# Patient Record
Sex: FEMALE | Race: WHITE | NOT HISPANIC OR LATINO | Employment: STUDENT | URBAN - METROPOLITAN AREA
[De-identification: names, ages, dates, MRNs, and addresses within clinical notes are randomized per-mention and may not be internally consistent; named-entity substitution may affect disease eponyms.]

---

## 2017-06-13 ENCOUNTER — TELEPHONE (OUTPATIENT)
Dept: URGENT CARE | Facility: CLINIC | Age: 47
End: 2017-06-13

## 2017-06-13 PROCEDURE — 87186 SC STD MICRODIL/AGAR DIL: CPT | Performed by: NURSE PRACTITIONER

## 2017-06-13 PROCEDURE — 87077 CULTURE AEROBIC IDENTIFY: CPT | Performed by: NURSE PRACTITIONER

## 2017-06-13 PROCEDURE — 87086 URINE CULTURE/COLONY COUNT: CPT | Performed by: NURSE PRACTITIONER

## 2017-06-13 NOTE — TELEPHONE ENCOUNTER
Ms. Larsen called stating she vomited approximately 1 hour after taking her first dose of Omnicef, she denies any other episodes of vomiting and states she thinks she has taken Omnicef in the past with no side effects. Macrobid 100mg BID x 7 days called into pharmacy on file. Advised to stop Omnicef, start macrobid and if continues to vomit go to ER.

## 2017-06-15 ENCOUNTER — TELEPHONE (OUTPATIENT)
Dept: URGENT CARE | Facility: CLINIC | Age: 47
End: 2017-06-15

## 2017-06-15 NOTE — TELEPHONE ENCOUNTER
Patient called for results of labs. Informed her of the results and stated that she should finish medication that was prescribed.   Patient stated understanding. No additional questions.

## 2017-09-22 ENCOUNTER — OFFICE VISIT (OUTPATIENT)
Dept: INTERNAL MEDICINE | Facility: CLINIC | Age: 47
End: 2017-09-22

## 2017-09-22 VITALS
HEIGHT: 64 IN | DIASTOLIC BLOOD PRESSURE: 76 MMHG | WEIGHT: 137.6 LBS | SYSTOLIC BLOOD PRESSURE: 125 MMHG | TEMPERATURE: 98.2 F | HEART RATE: 78 BPM | OXYGEN SATURATION: 100 % | BODY MASS INDEX: 23.49 KG/M2

## 2017-09-22 DIAGNOSIS — J45.20 ASTHMA, MILD INTERMITTENT, WELL-CONTROLLED: ICD-10-CM

## 2017-09-22 DIAGNOSIS — R09.81 NASAL CONGESTION: ICD-10-CM

## 2017-09-22 DIAGNOSIS — Z76.89 ENCOUNTER TO ESTABLISH CARE: Primary | ICD-10-CM

## 2017-09-22 PROCEDURE — 99203 OFFICE O/P NEW LOW 30 MIN: CPT | Performed by: NURSE PRACTITIONER

## 2017-09-22 RX ORDER — ASPIRIN 81 MG/1
81 TABLET ORAL WEEKLY
COMMUNITY
End: 2022-10-04

## 2017-09-22 RX ORDER — ALBUTEROL SULFATE 90 UG/1
2 AEROSOL, METERED RESPIRATORY (INHALATION)
Status: DISCONTINUED | OUTPATIENT
Start: 2017-09-22 | End: 2017-09-22

## 2017-09-22 RX ORDER — THIAMINE MONONITRATE (VIT B1) 100 MG
100 TABLET ORAL WEEKLY
COMMUNITY
End: 2022-10-04

## 2017-09-22 RX ORDER — CHLORPHENIRAMINE MALEATE 4 MG/1
4 TABLET ORAL EVERY 6 HOURS PRN
COMMUNITY
End: 2022-10-04

## 2017-09-22 RX ORDER — ALBUTEROL SULFATE 90 UG/1
2 AEROSOL, METERED RESPIRATORY (INHALATION) EVERY 4 HOURS PRN
Qty: 1 INHALER | Refills: 2 | Status: SHIPPED | OUTPATIENT
Start: 2017-09-22 | End: 2022-10-04

## 2017-09-22 NOTE — PROGRESS NOTES
"Mike Larsen is a 47 y.o. female here to establish care.  Chief Complaint   Patient presents with   • Establish Care     History of Present Illness     SOA- Has a history of asthma.  Has been well controlled for many years without the use of medications.  Concerned that asthma is \"coming back.\"  If she runs \"chasing the puppy\", she gets really short of breath.  Has been going on for a few months.  Feels like she cannot breath through her nose. Has always had a lot of problems with her sinuses and allergies.   Has tried saline rinses and neti pot without relief.  Has tried Flonase in th past for one day and it made her feel like her nose was dried up and she has a nose bleed.  Gets SOA nearly every day.  No nighttime awakenings.  Denies wheezing and cough  Does not have a rescue inhaler.       Health maintenance:    Influenza: declines  Tdap: 2007  Pap: 2013   Mammogram: 2012-declines (had dense breast tissue and had to have repeat deshawn with an US- was normal)  Tobacco use: denies  LMP:  9/21/2017      The following portions of the patient's history were reviewed and updated as appropriate: allergies, current medications, past family history, past medical history, past social history, past surgical history and problem list.    Review of Systems   Constitutional: Negative for chills, fatigue and fever.   HENT: Positive for congestion (nasal) and sinus pressure. Negative for nosebleeds, postnasal drip, rhinorrhea, sneezing and sore throat.    Eyes: Negative for pain, discharge and itching.   Respiratory: Positive for shortness of breath. Negative for cough, chest tightness and wheezing.    Cardiovascular: Negative for chest pain.   Gastrointestinal: Negative for abdominal pain, blood in stool, constipation, diarrhea, nausea and vomiting.   Endocrine: Negative for cold intolerance and heat intolerance.   Genitourinary: Negative for difficulty urinating, flank pain and frequency.   Musculoskeletal: Negative " "for arthralgias, back pain, joint swelling and myalgias.   Skin: Negative for pallor and rash.   Allergic/Immunologic: Positive for environmental allergies.   Neurological: Negative for dizziness, syncope, weakness, light-headedness, numbness and headaches.   Hematological: Negative for adenopathy. Does not bruise/bleed easily.   Psychiatric/Behavioral: Negative for dysphoric mood and sleep disturbance. The patient is not nervous/anxious.    All other systems reviewed and are negative.    Blood pressure 125/76, pulse 78, temperature 98.2 °F (36.8 °C), height 63.7\" (161.8 cm), weight 137 lb 9.6 oz (62.4 kg), last menstrual period 09/22/2017, SpO2 100 %, not currently breastfeeding.    Allergies   Allergen Reactions   • Epinephrine-Lidocaine-Na Metabisulfite [Lidocaine-Epinephrine] Dizziness     Past Medical History:   Diagnosis Date   • Allergic    • Asthma    • Factor V Leiden    • MVP (mitral valve prolapse)     echo every 5 yrs last one in 2013     Past Surgical History:   Procedure Laterality Date   • CHOLECYSTECTOMY  2013   • VAGINAL DELIVERY      x2, (2000, 2005)   • WISDOM TOOTH EXTRACTION  1993     Family History   Problem Relation Age of Onset   • Cancer Mother      lung   • COPD Mother    • Other Mother      PAD    • No Known Problems Father    • No Known Problems Sister    • No Known Problems Brother    • Alzheimer's disease Paternal Grandmother    • Cataracts Paternal Grandmother    • No Known Problems Sister    • No Known Problems Brother    • Hyperlipidemia Daughter    • Hyperlipidemia Daughter      Social History     Social History   • Marital status:      Spouse name: N/A   • Number of children: N/A   • Years of education: N/A     Occupational History   • student      Graduate school for speech pathology     Social History Main Topics   • Smoking status: Never Smoker   • Smokeless tobacco: Never Used   • Alcohol use 3.0 oz/week     5 Glasses of wine per week   • Drug use: No   • Sexual " activity: Yes     Partners: Male     Birth control/ protection: Other      Comment:  had vasectomy     Other Topics Concern   • Not on file     Social History Narrative   • No narrative on file     Immunization History   Administered Date(s) Administered   • DTaP, Unspecified 01/01/1993   • Influenza, Unspecified 01/01/1993   • Pneumococcal Polysaccharide 01/01/1993   • Rubella 01/01/1993   • Tdap 01/01/2007   • Tetanus 01/01/2007       Current Outpatient Prescriptions:   •  aspirin 81 MG EC tablet, Take 81 mg by mouth 1 (One) Time Per Week., Disp: , Rfl:   •  chlorpheniramine (CHLORPHEN) 4 MG tablet, Take 4 mg by mouth Every 6 (Six) Hours As Needed for Allergies., Disp: , Rfl:   •  Multiple Vitamins-Minerals (MULTIVITAL PO), Take  by mouth., Disp: , Rfl:   •  thiamine (VITAMIN B-1) 100 MG tablet, Take 100 mg by mouth 1 (One) Time Per Week., Disp: , Rfl:   •  budesonide (RHINOCORT AQUA) 32 MCG/ACT nasal spray, 1 spray into each nostril Daily., Disp: 1 bottle, Rfl: 1  •  Chlorcyclizine-Pseudoephed (STAHIST AD PO), Take  by mouth., Disp: , Rfl:   No current facility-administered medications for this visit.     Objective   Physical Exam   Constitutional: She is oriented to person, place, and time. She appears well-developed and well-nourished. She does not appear ill. No distress.   HENT:   Head: Normocephalic and atraumatic.   Right Ear: Tympanic membrane, external ear and ear canal normal.   Left Ear: Tympanic membrane, external ear and ear canal normal.   Nose: Mucosal edema and rhinorrhea present. Right sinus exhibits no maxillary sinus tenderness and no frontal sinus tenderness. Left sinus exhibits no maxillary sinus tenderness and no frontal sinus tenderness.   Mouth/Throat: Uvula is midline, oropharynx is clear and moist and mucous membranes are normal.   Eyes: Conjunctivae are normal. Pupils are equal, round, and reactive to light.   Neck: Normal range of motion.   Cardiovascular: Normal rate, regular  rhythm and normal heart sounds.    Pulmonary/Chest: Effort normal and breath sounds normal. No respiratory distress.   Abdominal: Soft. Bowel sounds are normal. She exhibits no distension.   Musculoskeletal: Normal range of motion. She exhibits no edema.   Lymphadenopathy:     She has no cervical adenopathy.   Neurological: She is alert and oriented to person, place, and time.   Skin: Skin is warm and dry. She is not diaphoretic.   Psychiatric: She has a normal mood and affect.   Nursing note and vitals reviewed.      Assessment/Plan   Elena was seen today for establish care.    Diagnoses and all orders for this visit:    Encounter to establish care    Nasal congestion  -     budesonide (RHINOCORT AQUA) 32 MCG/ACT nasal spray; 1 spray into each nostril Daily.    Asthma, mild intermittent, well-controlled  -     albuterol (PROVENTIL HFA;VENTOLIN HFA) 108 (90 Base) MCG/ACT inhaler; Inhale 2 puffs Every 4 (Four) Hours As Needed for Wheezing or Shortness of Air.      Outpatient Encounter Prescriptions as of 9/22/2017   Medication Sig Dispense Refill   • aspirin 81 MG EC tablet Take 81 mg by mouth 1 (One) Time Per Week.     • chlorpheniramine (CHLORPHEN) 4 MG tablet Take 4 mg by mouth Every 6 (Six) Hours As Needed for Allergies.     • Multiple Vitamins-Minerals (MULTIVITAL PO) Take  by mouth.     • thiamine (VITAMIN B-1) 100 MG tablet Take 100 mg by mouth 1 (One) Time Per Week.     • budesonide (RHINOCORT AQUA) 32 MCG/ACT nasal spray 1 spray into each nostril Daily. 1 bottle 1   • Chlorcyclizine-Pseudoephed (STAHIST AD PO) Take  by mouth.       Facility-Administered Encounter Medications as of 9/22/2017   Medication Dose Route Frequency Provider Last Rate Last Dose   • [DISCONTINUED] albuterol (PROVENTIL HFA;VENTOLIN HFA) inhaler 2 puff  2 puff Inhalation 4x Daily - RT Ebony VIEIRA MD                Albuterol inhaler PRN SOA, may consider spirometry at FU  Will try rhinocort for nasal sx's  Will requesrt records  from prior PCP, she will bring VISHNU back once she finds the name of her former PCP  RTC 1 month for PE with PAP and labs  or sooner with any problems, worsening of sx's  Plan of care discussed with pt. They verbalized understanding and agreement.

## 2017-11-09 PROCEDURE — 87070 CULTURE OTHR SPECIMN AEROBIC: CPT | Performed by: PERSONAL EMERGENCY RESPONSE ATTENDANT

## 2017-11-12 ENCOUNTER — TELEPHONE (OUTPATIENT)
Dept: URGENT CARE | Facility: CLINIC | Age: 47
End: 2017-11-12

## 2017-11-13 ENCOUNTER — TELEPHONE (OUTPATIENT)
Dept: URGENT CARE | Facility: CLINIC | Age: 47
End: 2017-11-13

## 2017-11-13 NOTE — TELEPHONE ENCOUNTER
Mrs. Larsen returned call to [facility] and I advised her that we got her throat culture results and it came back no strep and a light growth of normal respiratory altagracia. Patient stated that her throat is still very sore and she feels like it is turning into bronchitis.  I advised patient to follow-up with her PCP. Patient verbalized understanding.

## 2022-10-04 ENCOUNTER — OFFICE VISIT (OUTPATIENT)
Dept: FAMILY MEDICINE CLINIC | Facility: CLINIC | Age: 52
End: 2022-10-04

## 2022-10-04 VITALS
HEART RATE: 78 BPM | HEIGHT: 63 IN | BODY MASS INDEX: 28.33 KG/M2 | OXYGEN SATURATION: 99 % | DIASTOLIC BLOOD PRESSURE: 74 MMHG | SYSTOLIC BLOOD PRESSURE: 118 MMHG | WEIGHT: 159.9 LBS

## 2022-10-04 DIAGNOSIS — R11.2 NAUSEA AND VOMITING, UNSPECIFIED VOMITING TYPE: ICD-10-CM

## 2022-10-04 DIAGNOSIS — Z00.00 ENCOUNTER FOR ROUTINE ADULT MEDICAL EXAMINATION: Primary | ICD-10-CM

## 2022-10-04 DIAGNOSIS — Z12.11 COLON CANCER SCREENING: ICD-10-CM

## 2022-10-04 PROCEDURE — 99396 PREV VISIT EST AGE 40-64: CPT | Performed by: PHYSICIAN ASSISTANT

## 2022-10-04 PROCEDURE — 99213 OFFICE O/P EST LOW 20 MIN: CPT | Performed by: PHYSICIAN ASSISTANT

## 2022-10-04 RX ORDER — PANTOPRAZOLE SODIUM 40 MG/1
40 TABLET, DELAYED RELEASE ORAL DAILY
Qty: 30 TABLET | Refills: 5 | Status: SHIPPED | OUTPATIENT
Start: 2022-10-04

## 2022-10-04 RX ORDER — FLUTICASONE PROPIONATE 50 MCG
SPRAY, SUSPENSION (ML) NASAL
COMMUNITY
End: 2022-10-04

## 2022-10-04 NOTE — ASSESSMENT & PLAN NOTE
Discussed injury prevention, diet and exercise, safe sexual practices, and screening for common diseases.  NOTE: She refuses to do mammograms and prefers Cologuard rather than colonoscopy, reports no family HX of colon cancer.   Encouraged use of sunscreen and seatbelts. Discussed timing of colon cancer cancer screening, prostate cancer screening, and review of skin for lesions. Avoidance of tobacco encouraged. Limitation or avoidance of alcohol encouraged. Recommend yearly dental and eye exams. Also discussed monitoring of blood pressure and lipids. Routine screening labs ordered. Further recommendations will depend upon those results.

## 2022-10-04 NOTE — ASSESSMENT & PLAN NOTE
I am going to try Pantoprazole thinking this might be gastritis or GERD, but I did tell her if this doesn't improve she should see GI for an opinion.

## 2022-10-04 NOTE — PROGRESS NOTES
"Chief Complaint  Annual Exam    Subjective          Elena Larsen presents to [facility]  History of Present Illness patient comes in today for an annual exam.  She states that she continues to have periodic nausea and vomiting and this has been going on for a year, it comes and goes and is exacerbated by certain foods.  Objective   Vital Signs:   /74   Pulse 78   Ht 160 cm (63\")   Wt 72.5 kg (159 lb 14.4 oz)   SpO2 99%   BMI 28.33 kg/m²     Body mass index is 28.33 kg/m².    Review of Systems   Constitutional: Negative.  Negative for chills, fatigue and fever.   HENT: Negative.    Eyes: Negative.    Respiratory: Negative.  Negative for cough and shortness of breath.    Cardiovascular: Negative.  Negative for chest pain and palpitations.   Gastrointestinal: Positive for vomiting. Negative for abdominal pain, constipation, diarrhea, GERD and indigestion.   Endocrine: Negative.    Genitourinary: Negative.    Musculoskeletal: Negative.  Negative for back pain and myalgias.   Skin: Negative.    Allergic/Immunologic: Negative.    Neurological: Negative for dizziness and headache.   Hematological: Negative.    Psychiatric/Behavioral: Negative for depressed mood. The patient is not nervous/anxious.        Past History:  Medical History: has a past medical history of Allergic, Asthma, Dysplasia of cervix (2001), Factor V Leiden (HCC), and MVP (mitral valve prolapse).   Surgical History: has a past surgical history that includes Cholecystectomy (2013); Cavalier tooth extraction (1993); and Vaginal delivery.   Family History: family history includes Alcohol abuse in her maternal grandmother and mother; Alzheimer's disease in her paternal grandmother; COPD in her mother; Cancer in her mother; Cataracts in her paternal grandmother; Coronary artery disease in her paternal grandfather; Hyperlipidemia in her daughter and daughter; No Known Problems in her brother, brother, father, sister, " and sister; Other in her mother.   Social History: reports that she has never smoked. She has never used smokeless tobacco. She reports current alcohol use of about 5.0 standard drinks of alcohol per week. She reports that she does not use drugs.      Current Outpatient Medications:   •  Chlorcyclizine-Pseudoephed (STAHIST AD PO), Take  by mouth., Disp: , Rfl:   •  Multiple Vitamins-Minerals (MULTIVITAL PO), Take  by mouth., Disp: , Rfl:   •  pantoprazole (PROTONIX) 40 MG EC tablet, Take 1 tablet by mouth Daily., Disp: 30 tablet, Rfl: 5    Allergies: Epinephrine-lidocaine-na metabisulfite [lidocaine-epinephrine] and Epinephrine    Physical Exam  Vitals reviewed.   HENT:      Head: Normocephalic.      Nose: Nose normal.      Mouth/Throat:      Mouth: Mucous membranes are moist.   Eyes:      Pupils: Pupils are equal, round, and reactive to light.   Cardiovascular:      Rate and Rhythm: Normal rate and regular rhythm.      Pulses: Normal pulses.      Heart sounds: Normal heart sounds.   Pulmonary:      Effort: Pulmonary effort is normal.      Breath sounds: Normal breath sounds.   Abdominal:      General: Abdomen is flat. Bowel sounds are normal. There is no distension.      Palpations: Abdomen is soft. There is no mass.      Tenderness: There is no abdominal tenderness. There is no guarding or rebound.      Hernia: No hernia is present.   Musculoskeletal:         General: Normal range of motion.      Cervical back: Normal range of motion and neck supple.   Skin:     General: Skin is warm and dry.   Neurological:      General: No focal deficit present.      Mental Status: She is alert and oriented to person, place, and time.   Psychiatric:         Mood and Affect: Mood normal.         Behavior: Behavior normal.          Result Review :                   Assessment and Plan    Diagnoses and all orders for this visit:    1. Encounter for routine adult medical examination (Primary)  Assessment & Plan:  Discussed injury  prevention, diet and exercise, safe sexual practices, and screening for common diseases.  NOTE: She refuses to do mammograms and prefers Cologuard rather than colonoscopy, reports no family HX of colon cancer.   Encouraged use of sunscreen and seatbelts. Discussed timing of colon cancer cancer screening, prostate cancer screening, and review of skin for lesions. Avoidance of tobacco encouraged. Limitation or avoidance of alcohol encouraged. Recommend yearly dental and eye exams. Also discussed monitoring of blood pressure and lipids. Routine screening labs ordered. Further recommendations will depend upon those results.     Orders:  -     CBC & Differential; Future  -     Comprehensive Metabolic Panel; Future  -     Lipid Panel; Future  -     TSH; Future  -     CBC & Differential  -     Comprehensive Metabolic Panel  -     Lipid Panel  -     TSH    2. Colon cancer screening  Assessment & Plan:  Will do Cologuard.      Orders:  -     Cologuard - Stool, Per Rectum; Future    3. Nausea and vomiting, unspecified vomiting type  Assessment & Plan:  I am going to try Pantoprazole thinking this might be gastritis or GERD, but I did tell her if this doesn't improve she should see GI for an opinion.       Orders:  -     Ambulatory Referral to Gastroenterology    Other orders  -     pantoprazole (PROTONIX) 40 MG EC tablet; Take 1 tablet by mouth Daily.  Dispense: 30 tablet; Refill: 5      Follow Up   Return if symptoms worsen or fail to improve.  Patient was given instructions and counseling regarding her condition or for health maintenance advice. Please see specific information pulled into the AVS if appropriate.     Sparkle Champagne PA-C

## 2022-10-05 LAB
ALBUMIN SERPL-MCNC: 4.9 G/DL (ref 3.8–4.8)
ALBUMIN/GLOB SERPL: 2.1 {RATIO} (ref 1.2–2.2)
ALP SERPL-CCNC: 77 IU/L (ref 44–121)
ALT SERPL-CCNC: 13 IU/L (ref 0–32)
AST SERPL-CCNC: 19 IU/L (ref 0–40)
BASOPHILS # BLD AUTO: 0.1 X10E3/UL (ref 0–0.2)
BASOPHILS NFR BLD AUTO: 1 %
BILIRUB SERPL-MCNC: 0.4 MG/DL (ref 0–1.2)
BUN SERPL-MCNC: 11 MG/DL (ref 6–24)
BUN/CREAT SERPL: 14 (ref 9–23)
CALCIUM SERPL-MCNC: 9.4 MG/DL (ref 8.7–10.2)
CHLORIDE SERPL-SCNC: 105 MMOL/L (ref 96–106)
CHOLEST SERPL-MCNC: 202 MG/DL (ref 100–199)
CO2 SERPL-SCNC: 26 MMOL/L (ref 20–29)
CREAT SERPL-MCNC: 0.8 MG/DL (ref 0.57–1)
EGFRCR SERPLBLD CKD-EPI 2021: 90 ML/MIN/1.73
EOSINOPHIL # BLD AUTO: 0.1 X10E3/UL (ref 0–0.4)
EOSINOPHIL NFR BLD AUTO: 1 %
ERYTHROCYTE [DISTWIDTH] IN BLOOD BY AUTOMATED COUNT: 11.9 % (ref 11.7–15.4)
GLOBULIN SER CALC-MCNC: 2.3 G/DL (ref 1.5–4.5)
GLUCOSE SERPL-MCNC: 89 MG/DL (ref 70–99)
HCT VFR BLD AUTO: 43.2 % (ref 34–46.6)
HDLC SERPL-MCNC: 77 MG/DL
HGB BLD-MCNC: 13.7 G/DL (ref 11.1–15.9)
IMM GRANULOCYTES # BLD AUTO: 0 X10E3/UL (ref 0–0.1)
IMM GRANULOCYTES NFR BLD AUTO: 0 %
LDLC SERPL CALC-MCNC: 108 MG/DL (ref 0–99)
LYMPHOCYTES # BLD AUTO: 1.9 X10E3/UL (ref 0.7–3.1)
LYMPHOCYTES NFR BLD AUTO: 35 %
MCH RBC QN AUTO: 26.8 PG (ref 26.6–33)
MCHC RBC AUTO-ENTMCNC: 31.7 G/DL (ref 31.5–35.7)
MCV RBC AUTO: 84 FL (ref 79–97)
MONOCYTES # BLD AUTO: 0.4 X10E3/UL (ref 0.1–0.9)
MONOCYTES NFR BLD AUTO: 8 %
NEUTROPHILS # BLD AUTO: 3 X10E3/UL (ref 1.4–7)
NEUTROPHILS NFR BLD AUTO: 55 %
PLATELET # BLD AUTO: 331 X10E3/UL (ref 150–450)
POTASSIUM SERPL-SCNC: 4.9 MMOL/L (ref 3.5–5.2)
PROT SERPL-MCNC: 7.2 G/DL (ref 6–8.5)
RBC # BLD AUTO: 5.12 X10E6/UL (ref 3.77–5.28)
SODIUM SERPL-SCNC: 145 MMOL/L (ref 134–144)
TRIGL SERPL-MCNC: 95 MG/DL (ref 0–149)
TSH SERPL DL<=0.005 MIU/L-ACNC: 1.87 UIU/ML (ref 0.45–4.5)
VLDLC SERPL CALC-MCNC: 17 MG/DL (ref 5–40)
WBC # BLD AUTO: 5.5 X10E3/UL (ref 3.4–10.8)

## 2022-10-07 ENCOUNTER — TELEPHONE (OUTPATIENT)
Dept: FAMILY MEDICINE CLINIC | Facility: CLINIC | Age: 52
End: 2022-10-07

## 2023-09-14 ENCOUNTER — OFFICE VISIT (OUTPATIENT)
Dept: FAMILY MEDICINE CLINIC | Facility: CLINIC | Age: 53
End: 2023-09-14
Payer: COMMERCIAL

## 2023-09-14 VITALS
BODY MASS INDEX: 27.64 KG/M2 | OXYGEN SATURATION: 98 % | SYSTOLIC BLOOD PRESSURE: 118 MMHG | HEART RATE: 87 BPM | WEIGHT: 156 LBS | DIASTOLIC BLOOD PRESSURE: 80 MMHG | HEIGHT: 63 IN

## 2023-09-14 DIAGNOSIS — M54.42 ACUTE BILATERAL LOW BACK PAIN WITH LEFT-SIDED SCIATICA: Primary | ICD-10-CM

## 2023-09-14 PROCEDURE — 99213 OFFICE O/P EST LOW 20 MIN: CPT | Performed by: PHYSICIAN ASSISTANT

## 2023-09-14 RX ORDER — BACLOFEN 10 MG/1
10 TABLET ORAL 3 TIMES DAILY
Qty: 30 TABLET | Refills: 1 | Status: SHIPPED | OUTPATIENT
Start: 2023-09-14

## 2023-09-14 RX ORDER — KETOROLAC TROMETHAMINE 30 MG/ML
60 INJECTION, SOLUTION INTRAMUSCULAR; INTRAVENOUS ONCE
Status: COMPLETED | OUTPATIENT
Start: 2023-09-14 | End: 2023-09-14

## 2023-09-14 RX ORDER — CETIRIZINE HYDROCHLORIDE 10 MG/1
10 TABLET ORAL DAILY
COMMUNITY

## 2023-09-14 RX ORDER — PSEUDOEPHEDRINE HCL 30 MG
30 TABLET ORAL
COMMUNITY

## 2023-09-14 RX ORDER — CYCLOBENZAPRINE HCL 10 MG
10 TABLET ORAL
COMMUNITY
Start: 2023-09-06 | End: 2023-09-14

## 2023-09-14 RX ORDER — TRIAMCINOLONE ACETONIDE 40 MG/ML
80 INJECTION, SUSPENSION INTRA-ARTICULAR; INTRAMUSCULAR ONCE
Status: COMPLETED | OUTPATIENT
Start: 2023-09-14 | End: 2023-09-14

## 2023-09-14 RX ORDER — NABUMETONE 500 MG/1
500 TABLET, FILM COATED ORAL 2 TIMES DAILY PRN
Qty: 30 TABLET | Refills: 1 | Status: SHIPPED | OUTPATIENT
Start: 2023-09-14

## 2023-09-14 RX ADMIN — KETOROLAC TROMETHAMINE 60 MG: 30 INJECTION, SOLUTION INTRAMUSCULAR; INTRAVENOUS at 11:47

## 2023-09-14 RX ADMIN — TRIAMCINOLONE ACETONIDE 80 MG: 40 INJECTION, SUSPENSION INTRA-ARTICULAR; INTRAMUSCULAR at 11:48

## 2023-09-14 NOTE — PROGRESS NOTES
"Chief Complaint  Back Pain (Lower back pain started last week )    Subjective        Elena Larsen presents to [facility]  History of Present Illness  Patient reports today secondary to having low back pain for the past week.  Patient reports bending over at an awkward angle and then sneezing the day before her back pain started, believe that incident caused her flare up.  Patient reports having episodes of low back pain around 2 times every year.  Patient reports going to urgent care and being prescribed Flexeril.  Patient states she has been taking it off and on and no relief.  Patient reports going to her masseuse and the massage did not help.  Patient reports the pain is achy and initially it went down her left leg.  Patient states it is now sitting in her back.  Patient reports no relief with ibuprofen or Tylenol.  Back Pain      Objective   Vital Signs:  /80   Pulse 87   Ht 160 cm (63\")   Wt 70.8 kg (156 lb)   SpO2 98%   BMI 27.63 kg/m²   Estimated body mass index is 27.63 kg/m² as calculated from the following:    Height as of this encounter: 160 cm (63\").    Weight as of this encounter: 70.8 kg (156 lb).             Physical Exam  Vitals and nursing note reviewed.   Constitutional:       General: She is not in acute distress.     Appearance: She is not ill-appearing.   Cardiovascular:      Rate and Rhythm: Normal rate and regular rhythm.   Pulmonary:      Effort: Pulmonary effort is normal. No respiratory distress.   Musculoskeletal:         General: Tenderness present. Normal range of motion.      Comments: Patient is tender upon palpation to bilateral lumbar paraspinous muscles.  Patient is non-tender upon palpation to lumbar, cervical and thoracic spine.     Skin:     General: Skin is warm and dry.   Neurological:      Gait: Gait normal.   Psychiatric:         Mood and Affect: Mood normal.      Result Review :                   Assessment and Plan   Diagnoses and " all orders for this visit:    1. Acute bilateral low back pain with left-sided sciatica (Primary)  Assessment & Plan:  Patient provided with Toradol and Kenalog injection this date.  Patient will discontinue cyclobenzaprine since she reports no relief.  Patient prescribed baclofen and nabumetone today.  Discussed importance of stretching.  Advised patient to discontinue heavy workouts for the next 5 to 7 days.  Discussed signs of worsening symptoms and advise ER should they occur.    Orders:  -     baclofen (LIORESAL) 10 MG tablet; Take 1 tablet by mouth 3 (Three) Times a Day.  Dispense: 30 tablet; Refill: 1  -     nabumetone (RELAFEN) 500 MG tablet; Take 1 tablet by mouth 2 (Two) Times a Day As Needed for Mild Pain or Moderate Pain. With food  Dispense: 30 tablet; Refill: 1  -     triamcinolone acetonide (KENALOG-40) injection 80 mg  -     ketorolac (TORADOL) injection 60 mg             Follow Up   Return if symptoms worsen or fail to improve.  Patient was given instructions and counseling regarding her condition or for health maintenance advice. Please see specific information pulled into the AVS if appropriate.

## 2023-09-14 NOTE — ASSESSMENT & PLAN NOTE
Patient provided with Toradol and Kenalog injection this date.  Patient will discontinue cyclobenzaprine since she reports no relief.  Patient prescribed baclofen and nabumetone today.  Discussed importance of stretching.  Advised patient to discontinue heavy workouts for the next 5 to 7 days.  Discussed signs of worsening symptoms and advise ER should they occur.

## 2023-12-13 ENCOUNTER — TELEPHONE (OUTPATIENT)
Dept: FAMILY MEDICINE CLINIC | Facility: CLINIC | Age: 53
End: 2023-12-13

## 2023-12-13 ENCOUNTER — OFFICE VISIT (OUTPATIENT)
Dept: FAMILY MEDICINE CLINIC | Facility: CLINIC | Age: 53
End: 2023-12-13
Payer: COMMERCIAL

## 2023-12-13 VITALS
DIASTOLIC BLOOD PRESSURE: 90 MMHG | SYSTOLIC BLOOD PRESSURE: 140 MMHG | HEART RATE: 78 BPM | BODY MASS INDEX: 27.87 KG/M2 | OXYGEN SATURATION: 100 % | HEIGHT: 63 IN | WEIGHT: 157.3 LBS

## 2023-12-13 DIAGNOSIS — M54.41 CHRONIC RIGHT-SIDED LOW BACK PAIN WITH RIGHT-SIDED SCIATICA: Primary | ICD-10-CM

## 2023-12-13 DIAGNOSIS — G89.29 CHRONIC RIGHT-SIDED LOW BACK PAIN WITH RIGHT-SIDED SCIATICA: Primary | ICD-10-CM

## 2023-12-13 RX ORDER — KETOROLAC TROMETHAMINE 30 MG/ML
60 INJECTION, SOLUTION INTRAMUSCULAR; INTRAVENOUS ONCE
Status: COMPLETED | OUTPATIENT
Start: 2023-12-13 | End: 2023-12-13

## 2023-12-13 RX ORDER — ASPIRIN 81 MG/1
81 TABLET, CHEWABLE ORAL DAILY
COMMUNITY

## 2023-12-13 RX ORDER — TRIAMCINOLONE ACETONIDE 40 MG/ML
80 INJECTION, SUSPENSION INTRA-ARTICULAR; INTRAMUSCULAR ONCE
Status: COMPLETED | OUTPATIENT
Start: 2023-12-13 | End: 2023-12-13

## 2023-12-13 RX ADMIN — KETOROLAC TROMETHAMINE 60 MG: 30 INJECTION, SOLUTION INTRAMUSCULAR; INTRAVENOUS at 15:45

## 2023-12-13 RX ADMIN — TRIAMCINOLONE ACETONIDE 80 MG: 40 INJECTION, SUSPENSION INTRA-ARTICULAR; INTRAMUSCULAR at 15:47

## 2023-12-13 NOTE — PROGRESS NOTES
Office Note     Name: Elena Larsen    : 1970     MRN: [id number]     Chief Complaint  Leg Pain    Subjective     History of Present Illness:  Elena Larsen is a 53 y.o. female who presents today for eval of severe pain in her right leg.  She has been dealing with sciatica on her R side since May/Chas[place].  She denies any history of injury.  She states that she has been going to massage therapy monthly, and her next appointment is 12/15/2023.  She states that her pain has been worse in her right leg today, and she feels like she needs to move it.  She states that she did have some numbness in her left leg earlier today, but it only lasted about 5 minutes.  She describes the pain as a pins-and-needles sensation.  She denies any weakness in her leg, loss of bowel or bladder control, or saddle anesthesia.  She states that it does not feel cold either to the touch or from the inside.  She states that it is also not discolored. She has not taken any medications for her symptoms.    Review of Systems:   Review of Systems   Genitourinary:  Negative for urinary incontinence.   Musculoskeletal:  Positive for back pain and myalgias.   Neurological:  Positive for numbness. Negative for weakness.       Past Medical History:   Past Medical History:   Diagnosis Date    Allergic     Asthma     Dysplasia of cervix     Factor V Leiden     gene    MVP (mitral valve prolapse)     echo every 5 yrs last one in        Past Surgical History:   Past Surgical History:   Procedure Laterality Date    CHOLECYSTECTOMY  2013    VAGINAL DELIVERY      x2, (, )    WISDOM TOOTH EXTRACTION         Family History:   Family History   Problem Relation Age of Onset    Cancer Mother         lung    COPD Mother     Other Mother         PAD     Alcohol abuse Mother     No Known Problems Father     No Known Problems Sister     No Known Problems Sister     No Known Problems Brother     No Known Problems Brother     Alcohol abuse  "Maternal Grandmother     Alzheimer's disease Paternal Grandmother     Cataracts Paternal Grandmother     Coronary artery disease Paternal Grandfather     Hyperlipidemia Daughter     Hyperlipidemia Daughter        Social History:   Social History     Socioeconomic History    Marital status:    Tobacco Use    Smoking status: Never    Smokeless tobacco: Never   Vaping Use    Vaping Use: Never used   Substance and Sexual Activity    Alcohol use: Yes     Alcohol/week: 5.0 standard drinks of alcohol     Types: 5 Glasses of wine per week    Drug use: Never    Sexual activity: Yes     Partners: Male     Birth control/protection: Other     Comment:  had vasectomy       Immunizations:   Immunization History   Administered Date(s) Administered    DTaP, Unspecified 01/01/1993    Influenza, Unspecified 01/01/1993    Pneumococcal Polysaccharide (PPSV23) 01/01/1993    Rubella 01/01/1993    Tdap 01/01/2007    Tetanus 01/01/2007        Medications:     Current Outpatient Medications:     aspirin 81 MG chewable tablet, Chew 1 tablet Daily., Disp: , Rfl:     baclofen (LIORESAL) 10 MG tablet, Take 1 tablet by mouth 3 (Three) Times a Day., Disp: 30 tablet, Rfl: 1    cetirizine (zyrTEC) 10 MG tablet, Take 1 tablet by mouth Daily., Disp: , Rfl:     pseudoephedrine (SUDAFED) 30 MG tablet, Take 1 tablet by mouth., Disp: , Rfl:     Allergies:   Allergies   Allergen Reactions    Epinephrine-Lidocaine-Na Metabisulfite [Lidocaine-Epinephrine] Dizziness    Epinephrine Palpitations       Objective     Vital Signs  /90 (BP Location: Left arm, Patient Position: Sitting, Cuff Size: Adult)   Pulse 78   Ht 160 cm (63\")   Wt 71.4 kg (157 lb 4.8 oz)   SpO2 100%   BMI 27.86 kg/m²   Estimated body mass index is 27.86 kg/m² as calculated from the following:    Height as of this encounter: 160 cm (63\").    Weight as of this encounter: 71.4 kg (157 lb 4.8 oz).    Physical Exam  Vitals and nursing note reviewed.   Constitutional:      "  General: She is not in acute distress.     Appearance: Normal appearance. She is not ill-appearing.   HENT:      Head: Normocephalic and atraumatic.   Cardiovascular:      Rate and Rhythm: Normal rate and regular rhythm.      Pulses: Normal pulses.      Heart sounds: Normal heart sounds.   Pulmonary:      Effort: Pulmonary effort is normal. No respiratory distress.      Breath sounds: Normal breath sounds.   Musculoskeletal:      Lumbar back: No bony tenderness. Normal range of motion. Positive left straight leg raise test. Negative right straight leg raise test.      Right lower leg: No edema.      Left lower leg: No edema.   Skin:     General: Skin is warm and dry.   Neurological:      General: No focal deficit present.      Mental Status: She is alert and oriented to person, place, and time.      Coordination: Coordination normal.      Gait: Gait normal.   Psychiatric:         Mood and Affect: Mood normal.         Behavior: Behavior normal.           Assessment and Plan     Assessment/Plan:  Diagnoses and all orders for this visit:    1. Chronic right-sided low back pain with right-sided sciatica (Primary)  Assessment & Plan:  We will do an x-ray today since she has not had imaging of her spine.  I will also try an anti-inflammatory and steroid shot today.  We will proceed pending results of imaging.  She is in agreement with this plan.    Orders:  -     XR Spine Lumbar 2 or 3 View; Future  -     ketorolac (TORADOL) injection 60 mg  -     triamcinolone acetonide (KENALOG-40) injection 80 mg        Follow Up  Return for scheduled f/u or sooner if sxs worsen or persist.    Sneha Hdz PA-C  [facility]

## 2023-12-13 NOTE — TELEPHONE ENCOUNTER
Tried to call Pt unable to reach her or leave Vm due to her VM not being set up. I was returning Pts call, if Pt calls back please let her know she will need to schedule an appt.Ana has not see Pt in over a Yr. If Pt is having trouble moving her leg, walking and it completely Numb she will need to go to the ER--Ok for HUB to relay

## 2023-12-13 NOTE — TELEPHONE ENCOUNTER
Name: Elena Larsen      Relationship: Self      Best Callback Number: [phone]    HUB PROVIDED THE RELAY MESSAGE FROM THE OFFICE  Tried to call Pt unable to reach her or leave Vm due to her VM not being set up. I was returning Pts call, if Pt calls back please let her know she will need to schedule an appt.Ana has not see Pt in over a Yr. If Pt is having trouble moving her leg, walking and it completely Numb she will need to go to the ER--Ok for HUB to relay            PATIENT: VOICED UNDERSTANDING AND HAS NO FURTHER QUESTIONS AT THIS TIME    ADDITIONAL INFORMATION:  REFUSED TO GO TO THE ER, MADE APPT WITH STEPHANIE [id number] 2.30 PM

## 2023-12-13 NOTE — TELEPHONE ENCOUNTER
Caller: Elena Larsen    Relationship to patient: Self    Best call back number: [phone]     Chief complaint: NUMBNESS IN LEG    Patient directed to call 911 or go to their nearest emergency room.     Patient verbalized understanding: [] Yes  [x] No  If no, why? PATIENT SAID THAT THEY'D RATHER NOT GO BECAUSE OF THE ASSOCIATED COSTS    Additional notes: PATIENT INITIALLY WANTED AN APPOINTMENT WITH MICHELLESANJUANA HARTMAN, AND WHEN ASKED ABOUT THEIR SYMPTOMS, STATED THAT THEY WERE HAVING A NUMBNESS IN THEIR LEG. PATIENT WAS REFERRED TO THE EMERGENCY ROOM BUT SAID THAT THEY'D RATHER AVOID GOING THERE BECAUSE OF WHAT IT WOULD COST AND THEY WERE UNSURE IF INSURANCE WOULD COVER THAT VISIT    PATIENT NOTED THAT THIS HAPPENED BEFORE AND THEY WERE REFERRED TO THE EMERGENCY ROOM BY THEIR THEN PCP IN [place] FOR AN ULTRASOUND    PATIENT ENDED THE CALL BY SAYING THEY'D SEE WHAT THEY COULD DO      PLEASE BE ADVISED

## 2023-12-17 PROBLEM — G89.29 CHRONIC RIGHT-SIDED LOW BACK PAIN WITH RIGHT-SIDED SCIATICA: Status: ACTIVE | Noted: 2023-12-17

## 2023-12-17 PROBLEM — M54.41 CHRONIC RIGHT-SIDED LOW BACK PAIN WITH RIGHT-SIDED SCIATICA: Status: ACTIVE | Noted: 2023-12-17

## 2023-12-18 ENCOUNTER — TELEPHONE (OUTPATIENT)
Dept: FAMILY MEDICINE CLINIC | Facility: CLINIC | Age: 53
End: 2023-12-18

## 2023-12-18 NOTE — TELEPHONE ENCOUNTER
Your x-ray shows mild curvature, which is not uncommon.  There are also some small bone spurs, indicating some mild arthritis.  At this point, I would recommend some physical therapy unless symptoms have worsened.  If they have, we could try to order an MRI at this time. Relay

## 2023-12-18 NOTE — ASSESSMENT & PLAN NOTE
We will do an x-ray today since she has not had imaging of her spine.  I will also try an anti-inflammatory and steroid shot today.  We will proceed pending results of imaging.  She is in agreement with this plan.

## 2023-12-20 ENCOUNTER — OFFICE VISIT (OUTPATIENT)
Dept: FAMILY MEDICINE CLINIC | Facility: CLINIC | Age: 53
End: 2023-12-20
Payer: COMMERCIAL

## 2023-12-20 VITALS
WEIGHT: 154 LBS | HEIGHT: 63 IN | OXYGEN SATURATION: 98 % | HEART RATE: 65 BPM | DIASTOLIC BLOOD PRESSURE: 70 MMHG | SYSTOLIC BLOOD PRESSURE: 120 MMHG | BODY MASS INDEX: 27.29 KG/M2

## 2023-12-20 DIAGNOSIS — G89.29 CHRONIC RIGHT-SIDED LOW BACK PAIN WITH RIGHT-SIDED SCIATICA: Primary | ICD-10-CM

## 2023-12-20 DIAGNOSIS — M54.41 CHRONIC RIGHT-SIDED LOW BACK PAIN WITH RIGHT-SIDED SCIATICA: Primary | ICD-10-CM

## 2023-12-20 PROBLEM — I34.1 MITRAL VALVE PROLAPSE: Status: ACTIVE | Noted: 2023-12-20

## 2023-12-20 PROCEDURE — 99213 OFFICE O/P EST LOW 20 MIN: CPT | Performed by: PHYSICIAN ASSISTANT

## 2023-12-20 NOTE — PROGRESS NOTES
"Chief Complaint  Imaging Only (Follow up on xray )    Subjective          Elena Larsen presents to [facility]      History of Present Illness patient is here today to discuss her ongoing issue with chronic right-sided low back pain with sciatica.  She did have xrays that showed some chronic facet arthritic changes, specifically osteophyte formation and Mild dextrocurvature.   She is wondering what her next step is to try and resolve her chronic pain.  She she does state that ibuprofen is helpful but she like to see a specialist to discuss this further.        Objective   Vital Signs:   /70 (BP Location: Left arm, Patient Position: Sitting, Cuff Size: Adult)   Pulse 65   Ht 160 cm (63\")   Wt 69.9 kg (154 lb)   SpO2 98%   BMI 27.28 kg/m²     Body mass index is 27.28 kg/m².    Review of Systems   Constitutional:  Negative for chills, fatigue and fever.   Respiratory:  Negative for cough, shortness of breath and wheezing.    Cardiovascular:  Negative for chest pain and palpitations.   Musculoskeletal:  Positive for back pain.   Neurological: Negative.    Psychiatric/Behavioral: Negative.         Past History:  Medical History: has a past medical history of Allergic, Asthma, Dysplasia of cervix (2001), Factor V Leiden, and MVP (mitral valve prolapse).   Surgical History: has a past surgical history that includes Cholecystectomy (2013); [place] tooth extraction (1993); and Vaginal delivery.   Family History: family history includes Alcohol abuse in her maternal grandmother and mother; Alzheimer's disease in her paternal grandmother; COPD in her mother; Cancer in her mother; Cataracts in her paternal grandmother; Coronary artery disease in her paternal grandfather; Hyperlipidemia in her daughter and daughter; No Known Problems in her brother, brother, father, sister, and sister; Other in her mother.   Social History: reports that she has never smoked. She has never used smokeless " tobacco. She reports current alcohol use of about 5.0 standard drinks of alcohol per week. She reports that she does not use drugs.      Current Outpatient Medications:     aspirin 81 MG chewable tablet, Chew 1 tablet Daily., Disp: , Rfl:     cetirizine (zyrTEC) 10 MG tablet, Take 1 tablet by mouth Daily., Disp: , Rfl:     pseudoephedrine (SUDAFED) 30 MG tablet, Take 1 tablet by mouth., Disp: , Rfl:   Allergies: Epinephrine-lidocaine-na metabisulfite [lidocaine-epinephrine] and Epinephrine    Physical Exam  Constitutional:       Appearance: Normal appearance.   Neurological:      Mental Status: She is alert and oriented to person, place, and time.   Psychiatric:         Mood and Affect: Mood normal.         Behavior: Behavior normal.             Assessment and Plan   Diagnoses and all orders for this visit:    1. Chronic right-sided low back pain with right-sided sciatica (Primary)  Assessment & Plan:  Per patient's request we will refer her to orthopedics for further evaluation will have her see Dr. Quiroz who specializes in chronic back pain.    Orders:  -     Ambulatory Referral to Orthopedic Surgery            Follow Up   Return in about 4 weeks (around 1/17/2024) for Annual physical.  Patient was given instructions and counseling regarding her condition or for health maintenance advice. Please see specific information pulled into the AVS if appropriate.     Sparkle Champagne PA-C   stretcher

## 2024-01-08 ENCOUNTER — TELEPHONE (OUTPATIENT)
Dept: FAMILY MEDICINE CLINIC | Facility: CLINIC | Age: 54
End: 2024-01-08
Payer: COMMERCIAL

## 2024-01-08 NOTE — TELEPHONE ENCOUNTER
Caller: Elena Larsen    Relationship: Self    Best call back number: [phone]    What form or medical record are you requesting: X-RAYS    Who is requesting this form or medical record from you: [facility]    How would you like to receive the form or medical records (pick-up, mail, fax):     Timeframe paperwork needed: PATIENT HAS APPOINTMENT WITH THE REFERRAL MS MEANS GAVE HER TO [facility] AND THEY TOLD HER TO BE SURE TO BRING HER X-RAYS. PLEASE CALL PATIENT WHEN THOSE ARE READY TO BE PICKED UP. PATIENT'S APPT IS AT NOON TODAY 01/08/2024    Additional notes: PATIENT WOULD LIKE IF YOU COULD EMAIL THEM OR SEND THEM DIRECTLY IF POSSIBLE.

## 2024-01-24 ENCOUNTER — OFFICE VISIT (OUTPATIENT)
Dept: FAMILY MEDICINE CLINIC | Facility: CLINIC | Age: 54
End: 2024-01-24
Payer: COMMERCIAL

## 2024-01-24 VITALS
HEART RATE: 67 BPM | SYSTOLIC BLOOD PRESSURE: 138 MMHG | WEIGHT: 157.1 LBS | OXYGEN SATURATION: 97 % | TEMPERATURE: 98.1 F | HEIGHT: 63 IN | DIASTOLIC BLOOD PRESSURE: 82 MMHG | BODY MASS INDEX: 27.84 KG/M2

## 2024-01-24 DIAGNOSIS — Z00.00 ENCOUNTER FOR ROUTINE ADULT MEDICAL EXAMINATION: Primary | ICD-10-CM

## 2024-01-24 DIAGNOSIS — Z12.31 SCREENING MAMMOGRAM FOR BREAST CANCER: ICD-10-CM

## 2024-01-24 NOTE — PROGRESS NOTES
"Chief Complaint  Annual Exam (Patient is fasting  today)    Mike Larsen presents to [facility]    History of Present Illness patient is here today for her annual physical and blood work.  She has no specific complaints today and reports feeling well.    Objective   Vital Signs:   /82 (BP Location: Right arm)   Pulse 67   Temp 98.1 °F (36.7 °C)   Ht 160 cm (63\")   Wt 71.3 kg (157 lb 1.6 oz)   SpO2 97%   BMI 27.83 kg/m²     Body mass index is 27.83 kg/m².    Review of Systems   Constitutional: Negative.  Negative for chills, fatigue and fever.   HENT: Negative.     Eyes: Negative.    Respiratory: Negative.  Negative for cough and shortness of breath.    Cardiovascular: Negative.  Negative for chest pain and palpitations.   Gastrointestinal: Negative.    Endocrine: Negative.    Genitourinary: Negative.    Musculoskeletal: Negative.  Negative for back pain and myalgias.   Skin: Negative.    Allergic/Immunologic: Negative.    Neurological:  Negative for dizziness and headache.   Hematological: Negative.    Psychiatric/Behavioral: Negative.  Negative for depressed mood. The patient is not nervous/anxious.        Past History:  Medical History: has a past medical history of Allergic, Asthma, Dysplasia of cervix (2001), Factor V Leiden, and MVP (mitral valve prolapse).   Surgical History: has a past surgical history that includes Cholecystectomy (2013); [place] tooth extraction (1993); and Vaginal delivery.   Family History: family history includes Alcohol abuse in her maternal grandmother and mother; Alzheimer's disease in her paternal grandmother; COPD in her mother; Cancer in her mother; Cataracts in her paternal grandmother; Coronary artery disease in her paternal grandfather; Hyperlipidemia in her daughter and daughter; No Known Problems in her brother, brother, father, sister, and sister; Other in her mother.   Social History: reports that she has never " smoked. She has never used smokeless tobacco. She reports current alcohol use of about 5.0 standard drinks of alcohol per week. She reports that she does not use drugs.      Current Outpatient Medications:     aspirin 81 MG chewable tablet, Chew 1 tablet Daily., Disp: , Rfl:     cetirizine (zyrTEC) 10 MG tablet, Take 1 tablet by mouth Daily., Disp: , Rfl:     pseudoephedrine (SUDAFED) 30 MG tablet, Take 1 tablet by mouth., Disp: , Rfl:     Allergies: Epinephrine-lidocaine-na metabisulfite [lidocaine-epinephrine] and Epinephrine    Physical Exam  Vitals reviewed.   Constitutional:       Appearance: Normal appearance.   HENT:      Head: Normocephalic and atraumatic.      Right Ear: Tympanic membrane, ear canal and external ear normal.      Left Ear: Tympanic membrane, ear canal and external ear normal.      Nose: Nose normal.      Mouth/Throat:      Mouth: Mucous membranes are moist.   Eyes:      Extraocular Movements: Extraocular movements intact.      Pupils: Pupils are equal, round, and reactive to light.   Cardiovascular:      Rate and Rhythm: Normal rate and regular rhythm.      Pulses: Normal pulses.      Heart sounds: Normal heart sounds.   Pulmonary:      Effort: Pulmonary effort is normal.      Breath sounds: Normal breath sounds.   Abdominal:      General: Abdomen is flat. Bowel sounds are normal.      Palpations: Abdomen is soft.   Musculoskeletal:         General: Normal range of motion.      Cervical back: Normal range of motion and neck supple.   Skin:     General: Skin is warm and dry.   Neurological:      General: No focal deficit present.      Mental Status: She is alert and oriented to person, place, and time.   Psychiatric:         Mood and Affect: Mood normal.         Behavior: Behavior normal.          Result Review :                   Assessment and Plan    Diagnoses and all orders for this visit:    1. Encounter for routine adult medical examination (Primary)  Assessment & Plan:  Discussed  injury prevention, diet and exercise, safe sexual practices, and screening for common diseases. Encouraged use of sunscreen and seatbelts. Encouraged SBE, avoidance of tobacco, limiting alcohol, and yearly dental and eye exams.     Orders:  -     CBC & Differential; Future  -     Comprehensive Metabolic Panel; Future  -     Hemoglobin A1c; Future  -     TSH; Future  -     Lipid Panel; Future  -     Hepatitis C antibody; Future    2. Screening mammogram for breast cancer  Assessment & Plan:  Routine screening mammogram ordered. Further recommendations will depend upon those results.     Orders:  -     Mammo Screening Digital Tomosynthesis Bilateral With CAD; Future    Other orders  -     Tdap Vaccine Greater Than or Equal To 8yo IM        Follow Up   Return for Annual physical.  Patient was given instructions and counseling regarding her condition or for health maintenance advice. Please see specific information pulled into the AVS if appropriate.     Sparkle Champagne PA-C

## 2024-01-25 ENCOUNTER — TELEPHONE (OUTPATIENT)
Dept: FAMILY MEDICINE CLINIC | Facility: CLINIC | Age: 54
End: 2024-01-25
Payer: COMMERCIAL

## 2024-01-25 LAB
ALBUMIN SERPL-MCNC: 4.6 G/DL (ref 3.8–4.9)
ALBUMIN/GLOB SERPL: 2 {RATIO} (ref 1.2–2.2)
ALP SERPL-CCNC: 95 IU/L (ref 44–121)
ALT SERPL-CCNC: 15 IU/L (ref 0–32)
AST SERPL-CCNC: 18 IU/L (ref 0–40)
BASOPHILS # BLD AUTO: 0.1 X10E3/UL (ref 0–0.2)
BASOPHILS NFR BLD AUTO: 1 %
BILIRUB SERPL-MCNC: 0.5 MG/DL (ref 0–1.2)
BUN SERPL-MCNC: 8 MG/DL (ref 6–24)
BUN/CREAT SERPL: 10 (ref 9–23)
CALCIUM SERPL-MCNC: 9.4 MG/DL (ref 8.7–10.2)
CHLORIDE SERPL-SCNC: 102 MMOL/L (ref 96–106)
CHOLEST SERPL-MCNC: 216 MG/DL (ref 100–199)
CO2 SERPL-SCNC: 24 MMOL/L (ref 20–29)
CREAT SERPL-MCNC: 0.78 MG/DL (ref 0.57–1)
EGFRCR SERPLBLD CKD-EPI 2021: 92 ML/MIN/1.73
EOSINOPHIL # BLD AUTO: 0.1 X10E3/UL (ref 0–0.4)
EOSINOPHIL NFR BLD AUTO: 1 %
ERYTHROCYTE [DISTWIDTH] IN BLOOD BY AUTOMATED COUNT: 13.1 % (ref 11.7–15.4)
GLOBULIN SER CALC-MCNC: 2.3 G/DL (ref 1.5–4.5)
GLUCOSE SERPL-MCNC: 96 MG/DL (ref 70–99)
HBA1C MFR BLD: 5.5 % (ref 4.8–5.6)
HCT VFR BLD AUTO: 44.2 % (ref 34–46.6)
HCV IGG SERPL QL IA: NON REACTIVE
HDLC SERPL-MCNC: 90 MG/DL
HGB BLD-MCNC: 13.7 G/DL (ref 11.1–15.9)
IMM GRANULOCYTES # BLD AUTO: 0 X10E3/UL (ref 0–0.1)
IMM GRANULOCYTES NFR BLD AUTO: 0 %
LDLC SERPL CALC-MCNC: 104 MG/DL (ref 0–99)
LYMPHOCYTES # BLD AUTO: 1.9 X10E3/UL (ref 0.7–3.1)
LYMPHOCYTES NFR BLD AUTO: 31 %
MCH RBC QN AUTO: 27.5 PG (ref 26.6–33)
MCHC RBC AUTO-ENTMCNC: 31 G/DL (ref 31.5–35.7)
MCV RBC AUTO: 89 FL (ref 79–97)
MONOCYTES # BLD AUTO: 0.5 X10E3/UL (ref 0.1–0.9)
MONOCYTES NFR BLD AUTO: 9 %
NEUTROPHILS # BLD AUTO: 3.7 X10E3/UL (ref 1.4–7)
NEUTROPHILS NFR BLD AUTO: 58 %
PLATELET # BLD AUTO: 325 X10E3/UL (ref 150–450)
POTASSIUM SERPL-SCNC: 4.1 MMOL/L (ref 3.5–5.2)
PROT SERPL-MCNC: 6.9 G/DL (ref 6–8.5)
RBC # BLD AUTO: 4.99 X10E6/UL (ref 3.77–5.28)
SODIUM SERPL-SCNC: 141 MMOL/L (ref 134–144)
TRIGL SERPL-MCNC: 128 MG/DL (ref 0–149)
TSH SERPL DL<=0.005 MIU/L-ACNC: 2.96 UIU/ML (ref 0.45–4.5)
VLDLC SERPL CALC-MCNC: 22 MG/DL (ref 5–40)
WBC # BLD AUTO: 6.3 X10E3/UL (ref 3.4–10.8)

## 2024-01-25 NOTE — TELEPHONE ENCOUNTER
Tried to call Pt unable to reach Pt or leave VM  due to Pts VM not being set up, if Pt calls back ok for HUB to relay--Her blood work was normal

## 2024-01-25 NOTE — TELEPHONE ENCOUNTER
Name: Elena Larsen      Relationship: Self      Best Callback Number: [phone]       HUB PROVIDED THE RELAY MESSAGE FROM THE OFFICE      PATIENT: VOICED UNDERSTANDING AND HAS NO FURTHER QUESTIONS AT THIS TIME    ADDITIONAL INFORMATION:

## 2024-01-25 NOTE — TELEPHONE ENCOUNTER
PATIENT RETURNING A CALL TO THE OFFICE. WILL BE AT THE DM THIS AFTERNOON AND WOULD PREFER A CALL BACK AFTER 4.

## 2024-06-19 ENCOUNTER — OFFICE VISIT (OUTPATIENT)
Dept: FAMILY MEDICINE CLINIC | Facility: CLINIC | Age: 54
End: 2024-06-19
Payer: COMMERCIAL

## 2024-06-19 VITALS
WEIGHT: 160.8 LBS | SYSTOLIC BLOOD PRESSURE: 120 MMHG | OXYGEN SATURATION: 98 % | HEART RATE: 78 BPM | DIASTOLIC BLOOD PRESSURE: 82 MMHG | BODY MASS INDEX: 28.49 KG/M2 | HEIGHT: 63 IN

## 2024-06-19 DIAGNOSIS — I73.9 PERIPHERAL VASCULAR DISEASE, UNSPECIFIED: Primary | ICD-10-CM

## 2024-06-19 PROCEDURE — 99213 OFFICE O/P EST LOW 20 MIN: CPT | Performed by: PHYSICIAN ASSISTANT

## 2024-06-19 NOTE — PROGRESS NOTES
"Chief Complaint  Hip Pain (Right Hip x 2 yrs)    Subjective          Elena Larsen presents to [facility]    History of Present Illness patient is here today to discuss her hip pain.  She has been seeing a group in [facility] and they have apparently done some type of imaging and told her that she has \"an impingement injury\" in the hip.  When she ask about whether or not it was nervous told her no it was not exactly a nerve so now she is concerned that it could be arterial.  She says that she and her mother both have a genetic predisposition to having factor V Leiden and her mother was having foot pain which turned out to be a vascular issue and she nearly lost her leg.  So this has Keena concerned that her issue may be vascular and she is requesting a referral to a vascular surgeon for an opinion.    Objective   Vital Signs:   /82 (BP Location: Right arm, Patient Position: Sitting, Cuff Size: Adult)   Pulse 78   Ht 160 cm (63\")   Wt 72.9 kg (160 lb 12.8 oz)   SpO2 98%   BMI 28.48 kg/m²     Body mass index is 28.48 kg/m².    Review of Systems   Constitutional:  Negative for chills, fatigue and fever.   Respiratory:  Negative for cough and shortness of breath.    Cardiovascular:  Negative for chest pain and palpitations.   Musculoskeletal:  Positive for arthralgias (right hip pain) and back pain. Negative for myalgias.   Neurological:  Negative for dizziness and headache.   Psychiatric/Behavioral:  Negative for depressed mood. The patient is not nervous/anxious.          Past History:  Medical History: has a past medical history of Allergic, Asthma, Dysplasia of cervix (2001), Factor V Leiden, and MVP (mitral valve prolapse).   Surgical History: has a past surgical history that includes Cholecystectomy (2013); [place] tooth extraction (1993); and Vaginal delivery.   Family History: family history includes Alcohol abuse in her maternal " grandmother and mother; Alzheimer's disease in her paternal grandmother; COPD in her mother; Cancer in her mother; Cataracts in her paternal grandmother; Coronary artery disease in her paternal grandfather; Hyperlipidemia in her daughter and daughter; No Known Problems in her brother, brother, father, sister, and sister; Other in her mother.   Social History: reports that she has never smoked. She has never used smokeless tobacco. She reports current alcohol use of about 5.0 standard drinks of alcohol per week. She reports that she does not use drugs.      Current Outpatient Medications:     aspirin 81 MG chewable tablet, Chew 1 tablet Daily., Disp: , Rfl:     cetirizine (zyrTEC) 10 MG tablet, Take 1 tablet by mouth Daily., Disp: , Rfl:     Allergies: Epinephrine-lidocaine-na metabisulfite [lidocaine-epinephrine] and Epinephrine    Physical Exam  Constitutional:       Appearance: Normal appearance.   Cardiovascular:      Rate and Rhythm: Normal rate and regular rhythm.      Heart sounds: Normal heart sounds.   Pulmonary:      Effort: Pulmonary effort is normal.      Breath sounds: Normal breath sounds.   Musculoskeletal:      Lumbar back: Tenderness present.      Right hip: Tenderness present. No deformity or crepitus. Normal range of motion.      Left hip: Normal.   Neurological:      Mental Status: She is alert and oriented to person, place, and time.   Psychiatric:         Mood and Affect: Mood normal.         Behavior: Behavior normal.          Result Review :                   Assessment and Plan    Diagnoses and all orders for this visit:    1. Peripheral vascular disease, unspecified (Primary)  Assessment & Plan:  Patient is concerned that her hip pain is related to PVD and is requesting a referral to a vascular surgeon.  She will try to upload the records from [facility] so that they can be forwarded to the surgeon's office.  Her mother apparently had similar pain and it turned out to be a vascular issue and  there is a familial risk for Factor V leiden.  She has a follow up with Sari later today to get a shot to help with the pain so she declined treatment here. I agreed to do the referral.     Orders:  -     Ambulatory Referral to Vascular Surgery        Follow Up   No follow-ups on file.  Patient was given instructions and counseling regarding her condition or for health maintenance advice. Please see specific information pulled into the AVS if appropriate.     Sparkle Champagne PA-C

## 2024-06-19 NOTE — ASSESSMENT & PLAN NOTE
Patient is concerned that her hip pain is related to PVD and is requesting a referral to a vascular surgeon.  She will try to upload the records from [facility] so that they can be forwarded to the surgeon's office.  Her mother apparently had similar pain and it turned out to be a vascular issue and there is a familial risk for Factor V leiden.  [facility] to [facility] with the pain so she declined treatment here. I agreed to do the referral.

## 2025-04-09 ENCOUNTER — LAB (OUTPATIENT)
Dept: LAB | Facility: HOSPITAL | Age: 55
End: 2025-04-09
Payer: COMMERCIAL

## 2025-04-09 ENCOUNTER — OFFICE VISIT (OUTPATIENT)
Dept: FAMILY MEDICINE CLINIC | Facility: CLINIC | Age: 55
End: 2025-04-09
Payer: COMMERCIAL

## 2025-04-09 VITALS
HEIGHT: 63 IN | DIASTOLIC BLOOD PRESSURE: 87 MMHG | OXYGEN SATURATION: 100 % | WEIGHT: 161.8 LBS | HEART RATE: 63 BPM | BODY MASS INDEX: 28.67 KG/M2 | SYSTOLIC BLOOD PRESSURE: 143 MMHG

## 2025-04-09 DIAGNOSIS — L98.8 SKIN LESION OF BREAST: ICD-10-CM

## 2025-04-09 DIAGNOSIS — R41.3 MEMORY IMPAIRMENT: ICD-10-CM

## 2025-04-09 DIAGNOSIS — Z76.89 ENCOUNTER TO ESTABLISH CARE WITH NEW DOCTOR: ICD-10-CM

## 2025-04-09 DIAGNOSIS — M67.431 GANGLION CYST OF WRIST, RIGHT: ICD-10-CM

## 2025-04-09 DIAGNOSIS — M25.59 PAIN IN OTHER JOINT: ICD-10-CM

## 2025-04-09 DIAGNOSIS — Z78.0 MENOPAUSE: ICD-10-CM

## 2025-04-09 DIAGNOSIS — Z76.89 ENCOUNTER TO ESTABLISH CARE WITH NEW DOCTOR: Primary | ICD-10-CM

## 2025-04-09 PROBLEM — M54.42 ACUTE BILATERAL LOW BACK PAIN WITH LEFT-SIDED SCIATICA: Status: RESOLVED | Noted: 2023-09-14 | Resolved: 2025-04-09

## 2025-04-09 PROBLEM — M25.50 JOINT PAIN: Status: ACTIVE | Noted: 2025-04-09

## 2025-04-09 PROBLEM — R11.2 NAUSEA AND VOMITING: Status: RESOLVED | Noted: 2022-10-04 | Resolved: 2025-04-09

## 2025-04-09 LAB
ALBUMIN SERPL-MCNC: 4.8 G/DL (ref 3.5–5.2)
ALBUMIN/GLOB SERPL: 1.7 G/DL
ALP SERPL-CCNC: 105 U/L (ref 39–117)
ALT SERPL W P-5'-P-CCNC: 15 U/L (ref 1–33)
ANION GAP SERPL CALCULATED.3IONS-SCNC: 11.1 MMOL/L (ref 5–15)
AST SERPL-CCNC: 24 U/L (ref 1–32)
BILIRUB SERPL-MCNC: 0.6 MG/DL (ref 0–1.2)
BUN SERPL-MCNC: 10 MG/DL (ref 6–20)
BUN/CREAT SERPL: 12.8 (ref 7–25)
CALCIUM SPEC-SCNC: 9.5 MG/DL (ref 8.6–10.5)
CHLORIDE SERPL-SCNC: 102 MMOL/L (ref 98–107)
CHOLEST SERPL-MCNC: 229 MG/DL (ref 0–200)
CO2 SERPL-SCNC: 26.9 MMOL/L (ref 22–29)
CREAT SERPL-MCNC: 0.78 MG/DL (ref 0.57–1)
CRP SERPL-MCNC: <0.3 MG/DL (ref 0–0.5)
DEPRECATED RDW RBC AUTO: 37.5 FL (ref 37–54)
EGFRCR SERPLBLD CKD-EPI 2021: 91.5 ML/MIN/1.73
ERYTHROCYTE [DISTWIDTH] IN BLOOD BY AUTOMATED COUNT: 12.4 % (ref 12.3–15.4)
GLOBULIN UR ELPH-MCNC: 2.8 GM/DL
GLUCOSE SERPL-MCNC: 85 MG/DL (ref 65–99)
HBA1C MFR BLD: 5.1 % (ref 4.8–5.6)
HCT VFR BLD AUTO: 43.9 % (ref 34–46.6)
HDLC SERPL-MCNC: 81 MG/DL (ref 40–60)
HGB BLD-MCNC: 14.3 G/DL (ref 12–15.9)
LDLC SERPL CALC-MCNC: 134 MG/DL (ref 0–100)
LDLC/HDLC SERPL: 1.63 {RATIO}
MCH RBC QN AUTO: 27.3 PG (ref 26.6–33)
MCHC RBC AUTO-ENTMCNC: 32.6 G/DL (ref 31.5–35.7)
MCV RBC AUTO: 83.8 FL (ref 79–97)
PLATELET # BLD AUTO: 283 10*3/MM3 (ref 140–450)
PMV BLD AUTO: 10.7 FL (ref 6–12)
POTASSIUM SERPL-SCNC: 4 MMOL/L (ref 3.5–5.2)
PROT SERPL-MCNC: 7.6 G/DL (ref 6–8.5)
RBC # BLD AUTO: 5.24 10*6/MM3 (ref 3.77–5.28)
SODIUM SERPL-SCNC: 140 MMOL/L (ref 136–145)
TRIGL SERPL-MCNC: 80 MG/DL (ref 0–150)
TSH SERPL DL<=0.05 MIU/L-ACNC: 2.78 UIU/ML (ref 0.27–4.2)
VLDLC SERPL-MCNC: 14 MG/DL (ref 5–40)
WBC NRBC COR # BLD AUTO: 4.92 10*3/MM3 (ref 3.4–10.8)

## 2025-04-09 PROCEDURE — 80050 GENERAL HEALTH PANEL: CPT

## 2025-04-09 PROCEDURE — 80061 LIPID PANEL: CPT

## 2025-04-09 PROCEDURE — 86140 C-REACTIVE PROTEIN: CPT

## 2025-04-09 PROCEDURE — 86038 ANTINUCLEAR ANTIBODIES: CPT

## 2025-04-09 PROCEDURE — 83036 HEMOGLOBIN GLYCOSYLATED A1C: CPT

## 2025-04-09 NOTE — PROGRESS NOTES
New Patient Office Visit      Date: 2025   Patient Name: Elena Larsen  : 1970   MRN: [id number]     Chief Complaint:    Chief Complaint   Patient presents with    New patient preventative medicine services     Establishing care     Labs Only     Pt states she would like to get labs drawn and she is also fasting        History of Present Illness: Elena Larsen is a 54 y.o. female who is here today to establish care.  She current lives with her  and her daughter is currently attending school at [employer].  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    Patient notes that she has a ganglion cyst on her right lateral anterior wrist.  She notes has been there around 6 months.  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  It is currently not impeding motion or is not painful.    Patient notes that she has been having some increased problems with word finding.  She notes that it has been going on the past few months.  She also notes that she had a troublesome event where she forgot the events from 2 days prior and had to ask her  what had occurred.  She also notes that sometimes upon awakening it is difficult for her to get her bearings.  She notes that she has lived in [place] for some time, but upon awakening it took her around 5 minutes to find her bearings and she was unsure where she was located.  She does note that she has a strong family history of memory problems as her grandmother was diagnosed with Alzheimer's and her mother also had dementia.  She is interested in establishing with a neurologist for further evaluation.    Patient believes she may be going through menopause.  She notes that her last cycle was in 2024.  She does wonder if her memory impairment may be related to hormonal changes.  She is interested in establishing locally with a gynecologist.  She is due for  updated Pap smear.    Patient have a history of mitral valve prolapse.  She notes that she last saw cardiology 10 years ago and was cleared for further follow-up at that time.    Patient notes that she has a Cologuard box at home and is not interested in an updated box today.  She declines any future mammograms at this time due to a past experience.      Subjective      Review of Systems:   Review of Systems   Musculoskeletal:  Positive for arthralgias.   Neurological:  Positive for memory problem.   All other systems reviewed and are negative.      Past Medical History:   Past Medical History:   Diagnosis Date    Allergic 2000    Seasonal & pet    Asthma 2007    Allergy/exercise induced    Dysplasia of cervix 2001    Factor V Leiden     gene    History of medical problems     Foot & knee problems. Plantar fasciitis both feet. Partial plantar fasciotomy left foot 2023    MVP (mitral valve prolapse)     echo every 5 yrs last one in 2013       Past Surgical History:   Past Surgical History:   Procedure Laterality Date    CHOLECYSTECTOMY  2013    VAGINAL DELIVERY      x2, (2000, 2005)    WISDOM TOOTH EXTRACTION  1993       Family History:   Family History   Problem Relation Age of Onset    Cancer Mother         lung    COPD Mother     Alcohol abuse Mother     No Known Problems Father     No Known Problems Sister     No Known Problems Sister     No Known Problems Brother     No Known Problems Brother     Alcohol abuse Maternal Grandmother     Alzheimer's disease Paternal Grandmother     Cataracts Paternal Grandmother     Coronary artery disease Paternal Grandfather     Hyperlipidemia Daughter     Hyperlipidemia Daughter        Social History:   Social History     Socioeconomic History    Marital status:    Tobacco Use    Smoking status: Never    Smokeless tobacco: Never   Vaping Use    Vaping status: Never Used   Substance and Sexual Activity    Alcohol use: Yes     Alcohol/week: 5.0 standard drinks of alcohol     " Types: 5 Glasses of wine per week    Drug use: Never    Sexual activity: Yes     Partners: Male     Birth control/protection: Other     Comment:  had vasectomy       Medications:     Current Outpatient Medications:     aspirin 81 MG chewable tablet, Chew 1 tablet Daily., Disp: , Rfl:     cetirizine (zyrTEC) 10 MG tablet, Take 1 tablet by mouth Daily., Disp: , Rfl:     diclofenac (VOLTAREN) 50 MG EC tablet, Take 1 tablet by mouth Daily., Disp: , Rfl:     Allergies:   Allergies   Allergen Reactions    Epinephrine-Lidocaine-Na Metabisulfite [Lidocaine-Epinephrine (Pf)] Dizziness    Epinephrine Palpitations       Objective     Physical Exam:  Vital Signs:   Vitals:    04/09/25 0910   BP: 143/87   Pulse: 63   SpO2: 100%   Weight: 73.4 kg (161 lb 12.8 oz)   Height: 160 cm (62.99\")     Body mass index is 28.67 kg/m².       Physical Exam  Vitals and nursing note reviewed.   Constitutional:       Appearance: Normal appearance. She is normal weight.   HENT:      Head: Normocephalic and atraumatic.      Nose: Nose normal.      Mouth/Throat:      Mouth: Mucous membranes are moist.   Eyes:      Extraocular Movements: Extraocular movements intact.      Pupils: Pupils are equal, round, and reactive to light.   Cardiovascular:      Rate and Rhythm: Normal rate and regular rhythm.   Pulmonary:      Effort: Pulmonary effort is normal.      Breath sounds: Normal breath sounds.   Abdominal:      General: Abdomen is flat.   Musculoskeletal:         General: Normal range of motion.      Cervical back: Normal range of motion.   Skin:     General: Skin is warm.   Neurological:      General: No focal deficit present.      Mental Status: She is alert and oriented to person, place, and time.   Psychiatric:         Mood and Affect: Mood normal.         Behavior: Behavior normal.         Thought Content: Thought content normal.         Judgment: Judgment normal.         Procedures    Results:   PHQ-9 Total Score:              Assessment " / Plan      Assessment/Plan:   Diagnoses and all orders for this visit:    1. Encounter to establish care with new doctor (Primary)  -     CBC (No Diff); Future  -     Lipid Panel; Future  -     Hemoglobin A1c; Future  -     Comprehensive Metabolic Panel; Future  -     TSH; Future    2. BMI 28.0-28.9,adult    3. Skin lesion of breast  Assessment & Plan:  Irritated skin tag of right breast, will refer to dermatology for eval and treat    Orders:  -     Ambulatory Referral to Dermatology    4. Menopause  Assessment & Plan:  Will place referral to gynecology for further evaluation and discussed possible HRT    Orders:  -     Ambulatory Referral to Gynecology    5. Ganglion cyst of wrist, right  Assessment & Plan:  Continue observation at this time, patient to reach out if symptoms persist or worsen      6. Pain in other joint  Assessment & Plan:  Start with laboratory evaluation today    Orders:  -     C-reactive Protein; Future  -     LUCAS by IFA, Reflex 9-biomarkers profile; Future    7. Memory impairment  Assessment & Plan:  History and increasing symptoms will refer to neurology for eval and treat    Orders:  -     Ambulatory Referral to Neurology         Follow Up:   Return in about 1 year (around 4/9/2026) for Annual physical.    Bárbara Cazares DO   [facility]

## 2025-04-14 LAB
ANA SER QL IF: POSITIVE
ANA SPECKLED TITR SER: ABNORMAL {TITER}
CENTROMERE B AB SER-ACNC: <0.2 AI (ref 0–0.9)
CHROMATIN AB SERPL-ACNC: <0.2 AI (ref 0–0.9)
DSDNA AB SER-ACNC: 3 IU/ML (ref 0–9)
ENA JO1 AB SER-ACNC: <0.2 AI (ref 0–0.9)
ENA RNP AB SER-ACNC: <0.2 AI (ref 0–0.9)
ENA SCL70 AB SER-ACNC: <0.2 AI (ref 0–0.9)
ENA SM AB SER-ACNC: <0.2 AI (ref 0–0.9)
ENA SS-A AB SER-ACNC: 0.2 AI (ref 0–0.9)
ENA SS-B AB SER-ACNC: <0.2 AI (ref 0–0.9)
LABORATORY COMMENT REPORT: ABNORMAL
Lab: ABNORMAL
Lab: ABNORMAL

## 2025-04-15 ENCOUNTER — PRIOR AUTHORIZATION (OUTPATIENT)
Dept: FAMILY MEDICINE CLINIC | Facility: CLINIC | Age: 55
End: 2025-04-15
Payer: COMMERCIAL

## 2025-04-15 NOTE — TELEPHONE ENCOUNTER
Key: [id number] - [id number]  Wegovy 0.25MG/0.5ML auto-injectors  status: PA RequestCreated: April 14th, 2025 [id number]: April 15th, 2025  Chart notes sent with request    .The request was denied because:  Your plan only covers this drug when it is used for certain health conditions. Covered  uses are when you have: A) An initial body mass index (BMI) of at least 30 kg/m2, or B)  An initial BMI of 27 kg/m2 to 30 kg/m2 with one or more weight-related health  conditions. Your plan does not cover this drug for your health condition that your doctor  told us you have. We reviewed the information we had. Your request has been denied.  Your doctor can send us any new or missing information for us to review. For this drug,  you may have to meet other criteria. You can request the drug policy for more details.  You can also request other plan documents for your review.    [employer] Prior Authorization (Commercial)  [facility] [place] [address]  Phone: [phone]  Fax: [phone]    Prescription Claim Appeals  [address] - [facility]  [address] [address]  [place] [address]  Fax: [phone]

## 2025-04-15 NOTE — LETTER
RE: LIAM TIJERINA    : 1970    Cardholder ID:  [id number]     Case ID or PA #:Key: [id number] - [id number]    Med:Wegovy 0.25MG/0.5ML auto-injectors    [facility]     ATTENTION: APPEALS DEPARTMENT:         I am writing to provide additional information to support my request to treat LIAM TIJERINA with Wegovy 0.25MG/0.5ML auto-injectors.    Pt meets the criteria. BMI 28.67 kg. Pt has HTN and a family HX of HLD, and HTN.     Given my patient's history and current status, the patient meets the Wegovy 0.25MG/0.5ML auto-injectors and I believe treatment is warranted, appropriate, and medically necessary.    If you have any questions or need futher information, please call my office at [phone] I look forward to receiving your timely response and approval of this claim         Sincerely,    Bárbara Cazaers DO

## 2025-04-21 ENCOUNTER — CLINICAL SUPPORT (OUTPATIENT)
Dept: FAMILY MEDICINE CLINIC | Facility: CLINIC | Age: 55
End: 2025-04-21
Payer: COMMERCIAL

## 2025-05-08 DIAGNOSIS — K06.8 BLEEDING GUMS: Primary | ICD-10-CM

## 2025-05-13 ENCOUNTER — PATIENT MESSAGE (OUTPATIENT)
Dept: FAMILY MEDICINE CLINIC | Facility: CLINIC | Age: 55
End: 2025-05-13
Payer: COMMERCIAL

## 2025-05-13 DIAGNOSIS — E53.8 B12 DEFICIENCY: Primary | ICD-10-CM

## 2025-05-14 ENCOUNTER — OFFICE VISIT (OUTPATIENT)
Dept: FAMILY MEDICINE CLINIC | Facility: CLINIC | Age: 55
End: 2025-05-14
Payer: COMMERCIAL

## 2025-05-14 VITALS
HEIGHT: 63 IN | DIASTOLIC BLOOD PRESSURE: 88 MMHG | BODY MASS INDEX: 27.64 KG/M2 | HEART RATE: 67 BPM | SYSTOLIC BLOOD PRESSURE: 143 MMHG | OXYGEN SATURATION: 99 % | WEIGHT: 156 LBS

## 2025-05-14 DIAGNOSIS — R41.3 MEMORY IMPAIRMENT: ICD-10-CM

## 2025-05-14 DIAGNOSIS — E53.8 B12 DEFICIENCY: Primary | ICD-10-CM

## 2025-05-14 PROBLEM — M53.3 DISORDER OF SACROILIAC JOINT: Status: ACTIVE | Noted: 2025-05-14

## 2025-05-14 PROBLEM — M47.816 LUMBAR SPONDYLOSIS: Status: ACTIVE | Noted: 2025-05-14

## 2025-05-14 PROBLEM — M47.817 SPONDYLOSIS OF LUMBOSACRAL SPINE WITHOUT MYELOPATHY: Status: ACTIVE | Noted: 2025-05-14

## 2025-05-14 RX ORDER — CYANOCOBALAMIN 1000 UG/ML
1000 INJECTION, SOLUTION INTRAMUSCULAR; SUBCUTANEOUS
Status: SHIPPED | OUTPATIENT
Start: 2025-05-14

## 2025-05-14 RX ADMIN — CYANOCOBALAMIN 1000 MCG: 1000 INJECTION, SOLUTION INTRAMUSCULAR; SUBCUTANEOUS at 12:12

## 2025-05-14 NOTE — ASSESSMENT & PLAN NOTE
As patient is symptomatic, will start replacement protocol.  Will give first dose today and patient plans to receive further doses at [facility].

## 2025-05-14 NOTE — PROGRESS NOTES
Office Note     Name: Elena Larsen    : 1970     MRN: [id number]     Chief Complaint  Results (Follow up for her MRI)    Subjective     History of Present Illness:  Elena Larsen is a 54 y.o. female who presents today for lab review.    Patient presents today to follow-up on her recent results obtained by [facility] neurology.  She underwent an extensive evaluation for memory impairment and was found to have B12 deficiency.  It was recommended that she undergo a supplementation protocol.  Patient does wonder why her B12 was low, but notes that she has had an antibiotic and PPI in the past.  She does note that she eats less meat than previously but is not on a vegan or vegetarian diet.  She has no other known nutritional deficiencies.  She notes she has taken a B complex in the past.    Patient also underwent an MRI of the brain that only had minor age-related changes.  She is following with neurology.    Review of Systems:   Review of Systems   Neurological:  Positive for memory problem.   All other systems reviewed and are negative.      Past Medical History:   Past Medical History:   Diagnosis Date    Allergic     Seasonal & pet    Asthma     Allergy/exercise induced    Dysplasia of cervix     Factor V Leiden     gene    History of medical problems     Foot & knee problems. Plantar fasciitis both feet. Partial plantar fasciotomy left foot     Hyperlipidemia     Gained 40 lbs in     Hypertension     Gained 40 lbs in     MVP (mitral valve prolapse)     echo every 5 yrs last one in 2013    Varicella     As a child       Past Surgical History:   Past Surgical History:   Procedure Laterality Date    CHOLECYSTECTOMY  2013    VAGINAL DELIVERY      x2, (, )    WISDOM TOOTH EXTRACTION         Family History:   Family History   Problem Relation Age of Onset    Cancer Mother         Lung cancer. Smoker    COPD Mother         Smoker    Alcohol abuse Mother      Other Mother         PAD, CVD smoker.    No Known Problems Father     No Known Problems Sister     No Known Problems Sister     No Known Problems Brother     No Known Problems Brother     Alcohol abuse Maternal Grandmother     Alzheimer's disease Paternal Grandmother     Cataracts Paternal Grandmother     Coronary artery disease Paternal Grandfather     Hyperlipidemia Daughter         Father has FH    Hyperlipidemia Daughter         Father has FH       Social History:   Social History     Socioeconomic History    Marital status:    Tobacco Use    Smoking status: Never    Smokeless tobacco: Never   Vaping Use    Vaping status: Never Used   Substance and Sexual Activity    Alcohol use: Yes     Alcohol/week: 5.0 standard drinks of alcohol     Types: 5 Glasses of wine per week    Drug use: Never    Sexual activity: Yes     Partners: Male     Birth control/protection: Other     Comment:  had vasectomy       Immunizations:   Immunization History   Administered Date(s) Administered    DTaP, Unspecified 01/01/1993    Influenza, Unspecified 01/01/1993    Pneumococcal Polysaccharide (PPSV23) 01/01/1993    Rubella 01/01/1993    Tdap 01/01/2007, 01/24/2024    Tetanus 01/01/2007        Medications:     Current Outpatient Medications:     aspirin 81 MG chewable tablet, Chew 1 tablet Daily., Disp: , Rfl:     cetirizine (zyrTEC) 10 MG tablet, Take 1 tablet by mouth Daily., Disp: , Rfl:     diclofenac (VOLTAREN) 50 MG EC tablet, Take 1 tablet by mouth Daily., Disp: , Rfl:     Semaglutide-Weight Management 0.25 MG/0.5ML solution auto-injector, Inject 0.5 mL under the skin into the appropriate area as directed 1 (One) Time Per Week., Disp: 2 mL, Rfl: 5    Cyanocobalamin 1000 MCG/ML kit, Inject 1 mL as directed Take As Directed. Please inject 1 mL daily x 1 week, then 1 mL weekly x 1 month, then monthly x 2 months., Disp: 1 kit, Rfl: 0    Current Facility-Administered Medications:     cyanocobalamin injection 1,000 mcg,  "1,000 mcg, Intramuscular, Q28 Days, Bárbara Cazares DO, 1,000 mcg at 05/14/25 1212    Allergies:   Allergies   Allergen Reactions    Epinephrine-Lidocaine-Na Metabisulfite [Lidocaine-Epinephrine (Pf)] Dizziness    Epinephrine Palpitations       Objective     Vital Signs  /88   Pulse 67   Ht 160 cm (62.99\")   Wt 70.8 kg (156 lb)   SpO2 99%   BMI 27.64 kg/m²   Estimated body mass index is 27.64 kg/m² as calculated from the following:    Height as of this encounter: 160 cm (62.99\").    Weight as of this encounter: 70.8 kg (156 lb).         Physical Exam  Vitals and nursing note reviewed.   Constitutional:       Appearance: Normal appearance. She is normal weight.   HENT:      Head: Normocephalic and atraumatic.      Nose: Nose normal.      Mouth/Throat:      Mouth: Mucous membranes are moist.   Eyes:      Extraocular Movements: Extraocular movements intact.      Pupils: Pupils are equal, round, and reactive to light.   Cardiovascular:      Rate and Rhythm: Normal rate.   Pulmonary:      Effort: Pulmonary effort is normal.   Abdominal:      General: Abdomen is flat.   Musculoskeletal:         General: Normal range of motion.      Cervical back: Normal range of motion.   Skin:     General: Skin is warm.   Neurological:      General: No focal deficit present.      Mental Status: She is alert and oriented to person, place, and time.   Psychiatric:         Mood and Affect: Mood normal.         Behavior: Behavior normal.         Thought Content: Thought content normal.         Judgment: Judgment normal.            Procedures     Results:  No results found for this or any previous visit (from the past 24 hours).     Assessment and Plan     Assessment/Plan:  Diagnoses and all orders for this visit:    1. B12 deficiency (Primary)  Assessment & Plan:  As patient is symptomatic, will start replacement protocol.  Will give first dose today and patient plans to receive further doses at [facility].    Orders:  -     " cyanocobalamin injection 1,000 mcg  -     Cyanocobalamin 1000 MCG/ML kit; Inject 1 mL as directed Take As Directed. Please inject 1 mL daily x 1 week, then 1 mL weekly x 1 month, then monthly x 2 months.  Dispense: 1 kit; Refill: 0    2. Memory impairment  Assessment & Plan:  Neurology evaluation reviewed, potentially related to B12 deficiency.  Undergoing supplementation.          Follow Up  Return in about 3 months (around 8/14/2025) for Recheck.    Bárbara Cazares,    [facility]

## 2025-05-14 NOTE — ASSESSMENT & PLAN NOTE
Neurology evaluation reviewed, potentially related to B12 deficiency.  Undergoing supplementation.

## 2025-05-15 ENCOUNTER — PATIENT MESSAGE (OUTPATIENT)
Dept: FAMILY MEDICINE CLINIC | Facility: CLINIC | Age: 55
End: 2025-05-15

## 2025-05-15 DIAGNOSIS — E51.9 THIAMINE DEFICIENCY: Primary | ICD-10-CM

## 2025-05-15 RX ORDER — THIAMINE HYDROCHLORIDE 100 MG/ML
100 INJECTION, SOLUTION INTRAMUSCULAR; INTRAVENOUS DAILY
Qty: 6 ML | Refills: 0 | Status: SHIPPED | OUTPATIENT
Start: 2025-05-15 | End: 2025-05-16

## 2025-05-16 ENCOUNTER — TELEPHONE (OUTPATIENT)
Dept: FAMILY MEDICINE CLINIC | Facility: CLINIC | Age: 55
End: 2025-05-16

## 2025-05-16 ENCOUNTER — PRIOR AUTHORIZATION (OUTPATIENT)
Dept: FAMILY MEDICINE CLINIC | Facility: CLINIC | Age: 55
End: 2025-05-16

## 2025-05-16 ENCOUNTER — CLINICAL SUPPORT (OUTPATIENT)
Dept: FAMILY MEDICINE CLINIC | Facility: CLINIC | Age: 55
End: 2025-05-16
Payer: COMMERCIAL

## 2025-05-16 DIAGNOSIS — E53.8 B12 DEFICIENCY: Primary | ICD-10-CM

## 2025-05-16 RX ORDER — CYANOCOBALAMIN 1000 UG/ML
1000 INJECTION, SOLUTION INTRAMUSCULAR; SUBCUTANEOUS DAILY
Status: COMPLETED | OUTPATIENT
Start: 2025-05-16 | End: 2025-05-19

## 2025-05-16 RX ORDER — LANOLIN ALCOHOL/MO/W.PET/CERES
100 CREAM (GRAM) TOPICAL DAILY
Qty: 90 TABLET | Refills: 3 | Status: SHIPPED | OUTPATIENT
Start: 2025-05-16

## 2025-05-16 RX ORDER — THIAMINE MONONITRATE (VIT B1) 100 MG
100 TABLET ORAL DAILY
Qty: 90 TABLET | Refills: 3 | Status: SHIPPED | OUTPATIENT
Start: 2025-05-16

## 2025-05-16 RX ADMIN — CYANOCOBALAMIN 1000 MCG: 1000 INJECTION, SOLUTION INTRAMUSCULAR; SUBCUTANEOUS at 08:45

## 2025-05-16 NOTE — TELEPHONE ENCOUNTER
(Key: [id number]) - [id number]  Thiamine HCl 100MG/ML solution  status: PA RequestCreated: May 15th, 2025 [id number]: May 16th, 2025

## 2025-05-19 ENCOUNTER — CLINICAL SUPPORT (OUTPATIENT)
Dept: FAMILY MEDICINE CLINIC | Facility: CLINIC | Age: 55
End: 2025-05-19
Payer: COMMERCIAL

## 2025-05-19 PROCEDURE — 96372 THER/PROPH/DIAG INJ SC/IM: CPT | Performed by: FAMILY MEDICINE

## 2025-05-19 RX ADMIN — CYANOCOBALAMIN 1000 MCG: 1000 INJECTION, SOLUTION INTRAMUSCULAR; SUBCUTANEOUS at 08:57

## 2025-05-20 ENCOUNTER — CLINICAL SUPPORT (OUTPATIENT)
Dept: FAMILY MEDICINE CLINIC | Facility: CLINIC | Age: 55
End: 2025-05-20
Payer: COMMERCIAL

## 2025-05-20 DIAGNOSIS — E53.8 B12 DEFICIENCY: Primary | ICD-10-CM

## 2025-05-20 RX ADMIN — CYANOCOBALAMIN 1000 MCG: 1000 INJECTION, SOLUTION INTRAMUSCULAR; SUBCUTANEOUS at 08:40

## 2025-05-21 ENCOUNTER — CLINICAL SUPPORT (OUTPATIENT)
Dept: FAMILY MEDICINE CLINIC | Facility: CLINIC | Age: 55
End: 2025-05-21
Payer: COMMERCIAL

## 2025-05-21 DIAGNOSIS — E53.8 B12 DEFICIENCY: Primary | ICD-10-CM

## 2025-05-21 RX ADMIN — CYANOCOBALAMIN 1000 MCG: 1000 INJECTION, SOLUTION INTRAMUSCULAR; SUBCUTANEOUS at 08:27

## 2025-05-22 ENCOUNTER — CLINICAL SUPPORT (OUTPATIENT)
Dept: FAMILY MEDICINE CLINIC | Facility: CLINIC | Age: 55
End: 2025-05-22
Payer: COMMERCIAL

## 2025-05-22 DIAGNOSIS — E53.8 B12 DEFICIENCY: Primary | ICD-10-CM

## 2025-05-22 RX ORDER — CYANOCOBALAMIN 1000 UG/ML
1000 INJECTION, SOLUTION INTRAMUSCULAR; SUBCUTANEOUS DAILY
Status: COMPLETED | OUTPATIENT
Start: 2025-05-22 | End: 2025-05-23

## 2025-05-22 RX ADMIN — CYANOCOBALAMIN 1000 MCG: 1000 INJECTION, SOLUTION INTRAMUSCULAR; SUBCUTANEOUS at 08:39

## 2025-05-23 ENCOUNTER — CLINICAL SUPPORT (OUTPATIENT)
Dept: FAMILY MEDICINE CLINIC | Facility: CLINIC | Age: 55
End: 2025-05-23
Payer: COMMERCIAL

## 2025-05-23 DIAGNOSIS — E53.8 B12 DEFICIENCY: Primary | ICD-10-CM

## 2025-05-23 PROCEDURE — 96372 THER/PROPH/DIAG INJ SC/IM: CPT | Performed by: FAMILY MEDICINE

## 2025-05-23 RX ADMIN — CYANOCOBALAMIN 1000 MCG: 1000 INJECTION, SOLUTION INTRAMUSCULAR; SUBCUTANEOUS at 08:41

## 2025-05-30 ENCOUNTER — CLINICAL SUPPORT (OUTPATIENT)
Dept: FAMILY MEDICINE CLINIC | Facility: CLINIC | Age: 55
End: 2025-05-30
Payer: COMMERCIAL

## 2025-05-30 DIAGNOSIS — E53.8 B12 DEFICIENCY: Primary | ICD-10-CM

## 2025-05-30 PROCEDURE — 96372 THER/PROPH/DIAG INJ SC/IM: CPT | Performed by: FAMILY MEDICINE

## 2025-05-30 RX ADMIN — CYANOCOBALAMIN 1000 MCG: 1000 INJECTION, SOLUTION INTRAMUSCULAR; SUBCUTANEOUS at 08:43

## 2025-06-06 ENCOUNTER — CLINICAL SUPPORT (OUTPATIENT)
Dept: FAMILY MEDICINE CLINIC | Facility: CLINIC | Age: 55
End: 2025-06-06
Payer: COMMERCIAL

## 2025-06-06 DIAGNOSIS — E53.8 B12 DEFICIENCY: Primary | ICD-10-CM

## 2025-06-10 ENCOUNTER — HOSPITAL ENCOUNTER (OUTPATIENT)
Dept: NUTRITION | Facility: HOSPITAL | Age: 55
Setting detail: RECURRING SERIES
Discharge: HOME | End: 2025-06-10

## 2025-06-10 PROCEDURE — 97802 MEDICAL NUTRITION INDIV IN: CPT

## 2025-06-10 NOTE — CONSULTS
[facility]          Initial 60 Minute Nutrition Visit    Date: 06/10/2025   Patient Name: Elena Larsen  : 1970   MRN: [id number]   Referring Provider: Bárbara Cazares, *    Reason for Visit: Nutrition counseling for vit b12 def, thiamine def and weight mgmt  Visit Format: IP    Nutrition Assessment       Social History:   Social History     Socioeconomic History    Marital status:    Tobacco Use    Smoking status: Never    Smokeless tobacco: Never   Vaping Use    Vaping status: Never Used   Substance and Sexual Activity    Alcohol use: Yes     Alcohol/week: 5.0 standard drinks of alcohol     Types: 5 Glasses of wine per week    Drug use: Never    Sexual activity: Yes     Partners: Male     Birth control/protection: Other     Comment:  had vasectomy     Active Problem List:   Patient Active Problem List    Diagnosis     Disorder of sacroiliac joint [M53.3]     Lumbar spondylosis [M47.816]     Spondylosis of lumbosacral spine without myelopathy [M47.817]     B12 deficiency [E53.8]     Menopause [Z78.0]     Skin lesion of breast [L98.8]     Ganglion cyst of wrist, right [M67.431]     Joint pain [M25.50]     Memory impairment [R41.3]     Peripheral vascular disease, unspecified [I73.9]     Encounter to establish care with new doctor [Z76.89]     Mitral valve prolapse [I34.1]     Chronic right-sided low back pain with right-sided sciatica [M54.41, G89.29]     Encounter for routine adult medical examination [Z00.00]     Colon cancer screening [Z12.11]       Current Medications:   Current Outpatient Medications:     aspirin 81 MG chewable tablet, Chew 1 tablet Daily., Disp: , Rfl:     cetirizine (zyrTEC) 10 MG tablet, Take 1 tablet by mouth Daily., Disp: , Rfl:     Cyanocobalamin 1000 MCG/ML kit, Inject 1 mL as directed Take As Directed. Please inject 1 mL daily x 1 week, then 1 mL weekly x 1 month, then monthly x 2 months., Disp: 1 kit, Rfl: 0    diclofenac  (VOLTAREN) 50 MG EC tablet, Take 1 tablet by mouth Daily., Disp: , Rfl:     Semaglutide-Weight Management 0.25 MG/0.5ML solution auto-injector, Inject 0.5 mL under the skin into the appropriate area as directed 1 (One) Time Per Week., Disp: 2 mL, Rfl: 5    thiamine (VITAMIN B-1) 100 MG tablet, Take 1 tablet by mouth Daily., Disp: 90 tablet, Rfl: 3    thiamine (VITAMIN B1) 100 MG tablet, Take 1 tablet by mouth Daily., Disp: 90 tablet, Rfl: 3    Current Facility-Administered Medications:     cyanocobalamin injection 1,000 mcg, 1,000 mcg, Intramuscular, Q28 Days, Bárbara Cazares DO, 1,000 mcg at 25 0843    Labs: , HDL 81,     Hunger Vital Sign Food Insecurity Assessment:  Within the past 12 months I/we worried whether our food would run out before I/we got money to buy more: Did not discuss   Within the past 12 months the food I/we bought just didn't last and I/we didn't have money to get more: Did not discuss   Use of food assistance programs (WIC, food stamps, food valladares) No       Food & Nutrition Related History       Food Allergies: Yes  Food Intolerances: Yes  Food Behavior: Does not have a consistent eating routine  Nutrition Impact Symptoms: None  Gastrointestinal conditions that impact intake or food choices: NOne  Details at home: N/A  Who prepares most meals: Pt  Who does grocery shopping: Did not discuss  How many meals are purchased from fast food/sit down restaurants per week: at least once  Difficulty chewin - Normal  Difficulty swallowin - Normal  Diet requirement related to personal preference or cultural belief: None  History of eating disorder/disordered eating habits: None  Language/communication details: Speaks english, learns best by discussion  Barriers to learning: No barriers identified at this time    24 Hour Recall: See assessment questionnaire  Time Food/beverages consumed                                       Anthropometrics      Height:   Ht Readings from  "Last 1 Encounters:   05/14/25 160 cm (62.99\")     Weight:   Wt Readings from Last 3 Encounters:   05/14/25 70.8 kg (156 lb)   04/09/25 73.4 kg (161 lb 12.8 oz)   06/19/24 72.9 kg (160 lb 12.8 oz)     BMI: There is no height or weight on file to calculate BMI.   Weight Change: Pt states she has 9 pounds in 8 weeks since being on wegovy     Physical Activity     Activity  Frequency Duration                                         Barriers to physical activity:  Pt states that she has plantar fascitis    Physical activity comments: Pt states that she used to workout but does not at the moment     Estimated Needs     Estimated Energy Needs: 1521 calories per day to maintain weight. Based off MSJ, AF 1.2    Estimated Protein Needs: 57 grams per day. Based off 0,8g/kg/BW    Estimated Fluid Needs: 6 oz minimum per day     Discussion / Education      Pt is a 55 year old female who was referred for nutrition counseling for weight mgmt, vit B12 deficiency and thiamine deficiency. Pt states that she does not eat a lot of meat and seafood. Pt states that she is open to eating fish more often. Pt states that she wants to lose weight. Pt states that she is currently on wegovy. Pt states that she is having a hard time losing weight. Pt states that she has noticed that she is not eating a lot on certain days. Pt states that it is hard for her to get enough protein in throughout the day.    RD discussed with pt about macronutrient education. RD discussed with pt about foods that are high in vit B12 and thiamine. RD discussed with pt about ways in increase protein intake. RD discussed with pt about meal ideas. RD discussed with pt about having a consistent eating routine. Will forward notes to provider.     Assessment of patient engagement: Engaged    Measurement of understanding: Patient verbalized understanding, Patient able to demonstrate understanding with teach back     Resources Provided: Macronutrient education packet, high " vitamin B12 foods list, foods high in thiamine from [facility]    Goal (s)      Goal 1: TBD     Plan of Care     PES Statement:   Overweight / Obesity related to diet and lifestye as evidenced by BMI.     Follow Up Visit      Follow Up:   7/21/2025 11:00 AM    Total of 60 minutes spent with patient on nutrition counseling. Education based on Academy of Nutrition and Dietetics guidelines. Patient was provided with RD's contact information. Thank you for this referral.

## 2025-06-16 ENCOUNTER — CLINICAL SUPPORT (OUTPATIENT)
Dept: FAMILY MEDICINE CLINIC | Facility: CLINIC | Age: 55
End: 2025-06-16
Payer: COMMERCIAL

## 2025-06-16 PROCEDURE — 96372 THER/PROPH/DIAG INJ SC/IM: CPT | Performed by: FAMILY MEDICINE

## 2025-06-16 RX ADMIN — CYANOCOBALAMIN 1000 MCG: 1000 INJECTION, SOLUTION INTRAMUSCULAR; SUBCUTANEOUS at 09:10

## 2025-06-23 ENCOUNTER — CLINICAL SUPPORT (OUTPATIENT)
Dept: FAMILY MEDICINE CLINIC | Facility: CLINIC | Age: 55
End: 2025-06-23
Payer: COMMERCIAL

## 2025-06-23 DIAGNOSIS — E53.8 B12 DEFICIENCY: Primary | ICD-10-CM

## 2025-06-23 PROCEDURE — 96372 THER/PROPH/DIAG INJ SC/IM: CPT | Performed by: FAMILY MEDICINE

## 2025-06-23 RX ADMIN — CYANOCOBALAMIN 1000 MCG: 1000 INJECTION, SOLUTION INTRAMUSCULAR; SUBCUTANEOUS at 09:19

## 2025-07-21 ENCOUNTER — CLINICAL SUPPORT (OUTPATIENT)
Dept: FAMILY MEDICINE CLINIC | Facility: CLINIC | Age: 55
End: 2025-07-21
Payer: COMMERCIAL

## 2025-07-21 PROCEDURE — 96372 THER/PROPH/DIAG INJ SC/IM: CPT | Performed by: FAMILY MEDICINE

## 2025-07-21 RX ADMIN — CYANOCOBALAMIN 1000 MCG: 1000 INJECTION, SOLUTION INTRAMUSCULAR; SUBCUTANEOUS at 09:21

## 2025-08-12 ENCOUNTER — PATIENT MESSAGE (OUTPATIENT)
Dept: FAMILY MEDICINE CLINIC | Facility: CLINIC | Age: 55
End: 2025-08-12
Payer: COMMERCIAL

## 2025-08-12 DIAGNOSIS — E53.8 B12 DEFICIENCY: Primary | ICD-10-CM

## 2025-08-18 ENCOUNTER — LAB (OUTPATIENT)
Dept: LAB | Facility: HOSPITAL | Age: 55
End: 2025-08-18
Payer: COMMERCIAL

## 2025-08-18 ENCOUNTER — OFFICE VISIT (OUTPATIENT)
Dept: FAMILY MEDICINE CLINIC | Facility: CLINIC | Age: 55
End: 2025-08-18
Payer: COMMERCIAL

## 2025-08-18 VITALS
DIASTOLIC BLOOD PRESSURE: 74 MMHG | WEIGHT: 156.6 LBS | OXYGEN SATURATION: 100 % | HEIGHT: 63 IN | HEART RATE: 76 BPM | BODY MASS INDEX: 27.75 KG/M2 | SYSTOLIC BLOOD PRESSURE: 116 MMHG

## 2025-08-18 DIAGNOSIS — E53.8 B12 DEFICIENCY: ICD-10-CM

## 2025-08-18 DIAGNOSIS — E53.8 B12 DEFICIENCY: Primary | ICD-10-CM

## 2025-08-18 DIAGNOSIS — R41.3 MEMORY IMPAIRMENT: ICD-10-CM

## 2025-08-18 DIAGNOSIS — K06.8 BLEEDING GUMS: ICD-10-CM

## 2025-08-18 PROBLEM — T78.40XA ALLERGIES: Status: ACTIVE | Noted: 2025-08-18

## 2025-08-18 PROBLEM — R76.8 ANA POSITIVE: Status: ACTIVE | Noted: 2025-08-18

## 2025-08-18 PROBLEM — Z00.00 ENCOUNTER FOR ROUTINE ADULT MEDICAL EXAMINATION: Status: RESOLVED | Noted: 2022-10-04 | Resolved: 2025-08-18

## 2025-08-18 PROBLEM — Z76.89 ENCOUNTER TO ESTABLISH CARE WITH NEW DOCTOR: Status: RESOLVED | Noted: 2024-01-24 | Resolved: 2025-08-18

## 2025-08-18 LAB
25(OH)D3 SERPL-MCNC: 35.3 NG/ML (ref 30–100)
ALBUMIN SERPL-MCNC: 4.4 G/DL (ref 3.5–5.2)
ALBUMIN/GLOB SERPL: 1.5 G/DL
ALP SERPL-CCNC: 77 U/L (ref 39–117)
ALT SERPL W P-5'-P-CCNC: 12 U/L (ref 1–33)
ANION GAP SERPL CALCULATED.3IONS-SCNC: 12.9 MMOL/L (ref 5–15)
AST SERPL-CCNC: 20 U/L (ref 1–32)
BILIRUB SERPL-MCNC: 0.5 MG/DL (ref 0–1.2)
BUN SERPL-MCNC: 9 MG/DL (ref 6–20)
BUN/CREAT SERPL: 12.2 (ref 7–25)
CALCIUM SPEC-SCNC: 9.5 MG/DL (ref 8.6–10.5)
CHLORIDE SERPL-SCNC: 105 MMOL/L (ref 98–107)
CHOLEST SERPL-MCNC: 180 MG/DL (ref 0–200)
CO2 SERPL-SCNC: 23.1 MMOL/L (ref 22–29)
CREAT SERPL-MCNC: 0.74 MG/DL (ref 0.57–1)
DEPRECATED RDW RBC AUTO: 37.2 FL (ref 37–54)
EGFRCR SERPLBLD CKD-EPI 2021: 96.9 ML/MIN/1.73
ERYTHROCYTE [DISTWIDTH] IN BLOOD BY AUTOMATED COUNT: 12.4 % (ref 12.3–15.4)
FOLATE SERPL-MCNC: 10.4 NG/ML (ref 4.78–24.2)
GLOBULIN UR ELPH-MCNC: 2.9 GM/DL
GLUCOSE SERPL-MCNC: 92 MG/DL (ref 65–99)
HBA1C MFR BLD: 5.1 % (ref 4.8–5.6)
HCT VFR BLD AUTO: 39.8 % (ref 34–46.6)
HCYS SERPL-MCNC: 11.3 UMOL/L (ref 0–15)
HDLC SERPL-MCNC: 65 MG/DL (ref 40–60)
HGB BLD-MCNC: 13 G/DL (ref 12–15.9)
LDLC SERPL CALC-MCNC: 105 MG/DL (ref 0–100)
LDLC/HDLC SERPL: 1.61 {RATIO}
MCH RBC QN AUTO: 27.5 PG (ref 26.6–33)
MCHC RBC AUTO-ENTMCNC: 32.7 G/DL (ref 31.5–35.7)
MCV RBC AUTO: 84.1 FL (ref 79–97)
PLATELET # BLD AUTO: 267 10*3/MM3 (ref 140–450)
PMV BLD AUTO: 11 FL (ref 6–12)
POTASSIUM SERPL-SCNC: 4.3 MMOL/L (ref 3.5–5.2)
PROT SERPL-MCNC: 7.3 G/DL (ref 6–8.5)
RBC # BLD AUTO: 4.73 10*6/MM3 (ref 3.77–5.28)
SODIUM SERPL-SCNC: 141 MMOL/L (ref 136–145)
TRIGL SERPL-MCNC: 53 MG/DL (ref 0–150)
TSH SERPL DL<=0.05 MIU/L-ACNC: 1.77 UIU/ML (ref 0.27–4.2)
VIT B12 BLD-MCNC: 436 PG/ML (ref 211–946)
VLDLC SERPL-MCNC: 10 MG/DL (ref 5–40)
WBC NRBC COR # BLD AUTO: 4.35 10*3/MM3 (ref 3.4–10.8)

## 2025-08-18 PROCEDURE — 83090 ASSAY OF HOMOCYSTEINE: CPT

## 2025-08-18 PROCEDURE — 80061 LIPID PANEL: CPT

## 2025-08-18 PROCEDURE — 99213 OFFICE O/P EST LOW 20 MIN: CPT | Performed by: FAMILY MEDICINE

## 2025-08-18 PROCEDURE — 82607 VITAMIN B-12: CPT

## 2025-08-18 PROCEDURE — 96372 THER/PROPH/DIAG INJ SC/IM: CPT | Performed by: FAMILY MEDICINE

## 2025-08-18 PROCEDURE — 83036 HEMOGLOBIN GLYCOSYLATED A1C: CPT

## 2025-08-18 PROCEDURE — 83921 ORGANIC ACID SINGLE QUANT: CPT

## 2025-08-18 PROCEDURE — 82746 ASSAY OF FOLIC ACID SERUM: CPT

## 2025-08-18 PROCEDURE — 86340 INTRINSIC FACTOR ANTIBODY: CPT

## 2025-08-18 PROCEDURE — 82306 VITAMIN D 25 HYDROXY: CPT

## 2025-08-18 PROCEDURE — 36415 COLL VENOUS BLD VENIPUNCTURE: CPT

## 2025-08-18 PROCEDURE — 80050 GENERAL HEALTH PANEL: CPT

## 2025-08-18 RX ORDER — BUTALB/ACETAMINOPHEN/CAFFEINE 50-325-40
1 TABLET ORAL DAILY
COMMUNITY

## 2025-08-18 RX ORDER — INDAPAMIDE 1.25 MG
1 TABLET ORAL 2 TIMES DAILY
COMMUNITY
Start: 2025-07-28

## 2025-08-18 RX ADMIN — CYANOCOBALAMIN 1000 MCG: 1000 INJECTION, SOLUTION INTRAMUSCULAR; SUBCUTANEOUS at 12:52

## 2025-08-19 ENCOUNTER — RESULTS FOLLOW-UP (OUTPATIENT)
Dept: FAMILY MEDICINE CLINIC | Facility: CLINIC | Age: 55
End: 2025-08-19
Payer: COMMERCIAL

## 2025-08-19 DIAGNOSIS — E53.8 B12 DEFICIENCY: Primary | ICD-10-CM

## 2025-08-21 LAB — IF BLOCK AB SER-ACNC: 1 AU/ML (ref 0–1.1)

## 2025-08-26 ENCOUNTER — PATIENT ROUNDING (BHMG ONLY) (OUTPATIENT)
Dept: OBSTETRICS AND GYNECOLOGY | Facility: CLINIC | Age: 55
End: 2025-08-26
Payer: COMMERCIAL

## 2025-08-26 ENCOUNTER — OFFICE VISIT (OUTPATIENT)
Dept: OBSTETRICS AND GYNECOLOGY | Facility: CLINIC | Age: 55
End: 2025-08-26
Payer: COMMERCIAL

## 2025-08-26 VITALS
HEIGHT: 63 IN | SYSTOLIC BLOOD PRESSURE: 120 MMHG | BODY MASS INDEX: 27.14 KG/M2 | WEIGHT: 153.2 LBS | DIASTOLIC BLOOD PRESSURE: 82 MMHG

## 2025-08-26 DIAGNOSIS — Z01.419 WOMEN'S ANNUAL ROUTINE GYNECOLOGICAL EXAMINATION: Primary | ICD-10-CM

## 2025-08-26 DIAGNOSIS — L23.0 ALLERGIC CONTACT DERMATITIS DUE TO METALS: ICD-10-CM

## 2025-08-26 DIAGNOSIS — Z12.31 SCREENING MAMMOGRAM FOR BREAST CANCER: ICD-10-CM

## 2025-08-26 RX ORDER — HYDROCORTISONE 25 MG/G
1 OINTMENT TOPICAL 2 TIMES DAILY
Qty: 28 G | Refills: 0 | Status: SHIPPED | OUTPATIENT
Start: 2025-08-26 | End: 2025-09-09

## 2025-08-27 LAB — REF LAB TEST METHOD: NORMAL
